# Patient Record
Sex: MALE | Race: WHITE | NOT HISPANIC OR LATINO | Employment: OTHER | URBAN - METROPOLITAN AREA
[De-identification: names, ages, dates, MRNs, and addresses within clinical notes are randomized per-mention and may not be internally consistent; named-entity substitution may affect disease eponyms.]

---

## 2017-05-30 ENCOUNTER — APPOINTMENT (OUTPATIENT)
Dept: RADIOLOGY | Facility: MEDICAL CENTER | Age: 77
End: 2017-05-30
Attending: NEUROLOGICAL SURGERY
Payer: COMMERCIAL

## 2017-05-30 ENCOUNTER — HOSPITAL ENCOUNTER (OUTPATIENT)
Facility: MEDICAL CENTER | Age: 77
End: 2017-06-01
Attending: NEUROLOGICAL SURGERY | Admitting: NEUROLOGICAL SURGERY
Payer: COMMERCIAL

## 2017-05-30 PROBLEM — M51.36 DDD (DEGENERATIVE DISC DISEASE), LUMBAR: Status: ACTIVE | Noted: 2017-05-30

## 2017-05-30 LAB
ANION GAP SERPL CALC-SCNC: 8 MMOL/L (ref 0–11.9)
APPEARANCE UR: CLEAR
APTT PPP: 29 SEC (ref 24.7–36)
BILIRUB UR QL STRIP.AUTO: NEGATIVE
BUN SERPL-MCNC: 14 MG/DL (ref 8–22)
CALCIUM SERPL-MCNC: 9.5 MG/DL (ref 8.4–10.2)
CHLORIDE SERPL-SCNC: 105 MMOL/L (ref 96–112)
CO2 SERPL-SCNC: 27 MMOL/L (ref 20–33)
COLOR UR: YELLOW
CREAT SERPL-MCNC: 0.87 MG/DL (ref 0.5–1.4)
GFR SERPL CREATININE-BSD FRML MDRD: >60 ML/MIN/1.73 M 2
GLUCOSE SERPL-MCNC: 91 MG/DL (ref 65–99)
GLUCOSE UR STRIP.AUTO-MCNC: NEGATIVE MG/DL
INR PPP: 0.97 (ref 0.87–1.13)
KETONES UR STRIP.AUTO-MCNC: ABNORMAL MG/DL
LEUKOCYTE ESTERASE UR QL STRIP.AUTO: NEGATIVE
MICRO URNS: ABNORMAL
NITRITE UR QL STRIP.AUTO: NEGATIVE
PH UR STRIP.AUTO: 6.5 [PH]
POTASSIUM SERPL-SCNC: 4.2 MMOL/L (ref 3.6–5.5)
PROT UR QL STRIP: NEGATIVE MG/DL
PROTHROMBIN TIME: 13.2 SEC (ref 12–14.6)
RBC UR QL AUTO: NEGATIVE
SODIUM SERPL-SCNC: 140 MMOL/L (ref 135–145)
SP GR UR STRIP.AUTO: 1.01

## 2017-05-30 PROCEDURE — 160035 HCHG PACU - 1ST 60 MINS PHASE I: Performed by: NEUROLOGICAL SURGERY

## 2017-05-30 PROCEDURE — 502240 HCHG MISC OR SUPPLY RC 0272: Performed by: NEUROLOGICAL SURGERY

## 2017-05-30 PROCEDURE — 85730 THROMBOPLASTIN TIME PARTIAL: CPT

## 2017-05-30 PROCEDURE — 81003 URINALYSIS AUTO W/O SCOPE: CPT

## 2017-05-30 PROCEDURE — 80048 BASIC METABOLIC PNL TOTAL CA: CPT

## 2017-05-30 PROCEDURE — 501838 HCHG SUTURE GENERAL: Performed by: NEUROLOGICAL SURGERY

## 2017-05-30 PROCEDURE — 500885 HCHG PACK, JACKSON TABLE: Performed by: NEUROLOGICAL SURGERY

## 2017-05-30 PROCEDURE — 700101 HCHG RX REV CODE 250

## 2017-05-30 PROCEDURE — 502626 HCHG SURGIFLO HEMOSTATIC MATRIX 6ML: Performed by: NEUROLOGICAL SURGERY

## 2017-05-30 PROCEDURE — 96374 THER/PROPH/DIAG INJ IV PUSH: CPT

## 2017-05-30 PROCEDURE — 160048 HCHG OR STATISTICAL LEVEL 1-5: Performed by: NEUROLOGICAL SURGERY

## 2017-05-30 PROCEDURE — 160002 HCHG RECOVERY MINUTES (STAT): Performed by: NEUROLOGICAL SURGERY

## 2017-05-30 PROCEDURE — 72020 X-RAY EXAM OF SPINE 1 VIEW: CPT

## 2017-05-30 PROCEDURE — G0378 HOSPITAL OBSERVATION PER HR: HCPCS

## 2017-05-30 PROCEDURE — 85610 PROTHROMBIN TIME: CPT

## 2017-05-30 PROCEDURE — 160009 HCHG ANES TIME/MIN: Performed by: NEUROLOGICAL SURGERY

## 2017-05-30 PROCEDURE — A9270 NON-COVERED ITEM OR SERVICE: HCPCS | Performed by: PHYSICIAN ASSISTANT

## 2017-05-30 PROCEDURE — 700102 HCHG RX REV CODE 250 W/ 637 OVERRIDE(OP): Performed by: PHYSICIAN ASSISTANT

## 2017-05-30 PROCEDURE — 160036 HCHG PACU - EA ADDL 30 MINS PHASE I: Performed by: NEUROLOGICAL SURGERY

## 2017-05-30 PROCEDURE — 160041 HCHG SURGERY MINUTES - EA ADDL 1 MIN LEVEL 4: Performed by: NEUROLOGICAL SURGERY

## 2017-05-30 PROCEDURE — 502627 HCHG HEMOSTAT, SURGICEL 4X4: Performed by: NEUROLOGICAL SURGERY

## 2017-05-30 PROCEDURE — 700111 HCHG RX REV CODE 636 W/ 250 OVERRIDE (IP): Performed by: PHYSICIAN ASSISTANT

## 2017-05-30 PROCEDURE — 160029 HCHG SURGERY MINUTES - 1ST 30 MINS LEVEL 4: Performed by: NEUROLOGICAL SURGERY

## 2017-05-30 PROCEDURE — 700111 HCHG RX REV CODE 636 W/ 250 OVERRIDE (IP)

## 2017-05-30 PROCEDURE — 700112 HCHG RX REV CODE 229: Performed by: PHYSICIAN ASSISTANT

## 2017-05-30 RX ORDER — RISPERIDONE 0.5 MG/1
0.5 TABLET ORAL
Status: DISCONTINUED | OUTPATIENT
Start: 2017-05-30 | End: 2017-06-01 | Stop reason: HOSPADM

## 2017-05-30 RX ORDER — HYDROCODONE BITARTRATE AND ACETAMINOPHEN 10; 325 MG/1; MG/1
1-2 TABLET ORAL EVERY 4 HOURS PRN
Status: DISCONTINUED | OUTPATIENT
Start: 2017-05-30 | End: 2017-06-01 | Stop reason: HOSPADM

## 2017-05-30 RX ORDER — DEXTROSE MONOHYDRATE, SODIUM CHLORIDE, AND POTASSIUM CHLORIDE 50; 1.49; 4.5 G/1000ML; G/1000ML; G/1000ML
INJECTION, SOLUTION INTRAVENOUS CONTINUOUS
Status: DISCONTINUED | OUTPATIENT
Start: 2017-05-30 | End: 2017-06-01 | Stop reason: HOSPADM

## 2017-05-30 RX ORDER — LOSARTAN POTASSIUM 25 MG/1
25 TABLET ORAL EVERY EVENING
Status: DISCONTINUED | OUTPATIENT
Start: 2017-05-30 | End: 2017-06-01 | Stop reason: HOSPADM

## 2017-05-30 RX ORDER — RISPERIDONE 0.5 MG/1
0.5 TABLET ORAL
COMMUNITY

## 2017-05-30 RX ORDER — LIDOCAINE AND PRILOCAINE 25; 25 MG/G; MG/G
1 CREAM TOPICAL
Status: DISCONTINUED | OUTPATIENT
Start: 2017-05-30 | End: 2017-06-01 | Stop reason: HOSPADM

## 2017-05-30 RX ORDER — PRAVASTATIN SODIUM 40 MG
20 TABLET ORAL NIGHTLY
COMMUNITY

## 2017-05-30 RX ORDER — ONDANSETRON 4 MG/1
4 TABLET, ORALLY DISINTEGRATING ORAL EVERY 4 HOURS PRN
Status: DISCONTINUED | OUTPATIENT
Start: 2017-05-30 | End: 2017-06-01 | Stop reason: HOSPADM

## 2017-05-30 RX ORDER — BUSPIRONE HYDROCHLORIDE 10 MG/1
10 TABLET ORAL 2 TIMES DAILY
Status: DISCONTINUED | OUTPATIENT
Start: 2017-05-30 | End: 2017-06-01 | Stop reason: HOSPADM

## 2017-05-30 RX ORDER — DEXTROSE MONOHYDRATE, SODIUM CHLORIDE, AND POTASSIUM CHLORIDE 50; 1.49; 4.5 G/1000ML; G/1000ML; G/1000ML
INJECTION, SOLUTION INTRAVENOUS
Status: COMPLETED
Start: 2017-05-30 | End: 2017-05-30

## 2017-05-30 RX ORDER — CLONAZEPAM 0.5 MG/1
0.5 TABLET ORAL DAILY
COMMUNITY

## 2017-05-30 RX ORDER — OXYCODONE HYDROCHLORIDE AND ACETAMINOPHEN 5; 325 MG/1; MG/1
1-2 TABLET ORAL EVERY 4 HOURS PRN
Status: DISCONTINUED | OUTPATIENT
Start: 2017-05-30 | End: 2017-06-01 | Stop reason: HOSPADM

## 2017-05-30 RX ORDER — CARBIDOPA AND LEVODOPA 50; 200 MG/1; MG/1
1 TABLET, EXTENDED RELEASE ORAL
Status: DISCONTINUED | OUTPATIENT
Start: 2017-05-30 | End: 2017-06-01 | Stop reason: HOSPADM

## 2017-05-30 RX ORDER — SODIUM CHLORIDE, SODIUM LACTATE, POTASSIUM CHLORIDE, CALCIUM CHLORIDE 600; 310; 30; 20 MG/100ML; MG/100ML; MG/100ML; MG/100ML
INJECTION, SOLUTION INTRAVENOUS CONTINUOUS
Status: DISCONTINUED | OUTPATIENT
Start: 2017-05-30 | End: 2017-06-01 | Stop reason: HOSPADM

## 2017-05-30 RX ORDER — DOCUSATE SODIUM 100 MG/1
100 CAPSULE, LIQUID FILLED ORAL 2 TIMES DAILY
Status: DISCONTINUED | OUTPATIENT
Start: 2017-05-30 | End: 2017-06-01 | Stop reason: HOSPADM

## 2017-05-30 RX ORDER — AMITRIPTYLINE HYDROCHLORIDE 25 MG/1
25 TABLET, FILM COATED ORAL
Status: DISCONTINUED | OUTPATIENT
Start: 2017-05-30 | End: 2017-06-01 | Stop reason: HOSPADM

## 2017-05-30 RX ORDER — BUSPIRONE HYDROCHLORIDE 10 MG/1
10 TABLET ORAL 2 TIMES DAILY
COMMUNITY

## 2017-05-30 RX ORDER — CEPHALEXIN 500 MG/1
500 CAPSULE ORAL EVERY 6 HOURS
Status: DISCONTINUED | OUTPATIENT
Start: 2017-05-31 | End: 2017-06-01 | Stop reason: HOSPADM

## 2017-05-30 RX ORDER — ENEMA 19; 7 G/133ML; G/133ML
1 ENEMA RECTAL
Status: DISCONTINUED | OUTPATIENT
Start: 2017-05-30 | End: 2017-06-01 | Stop reason: HOSPADM

## 2017-05-30 RX ORDER — CALCIUM CARBONATE 500 MG/1
500 TABLET, CHEWABLE ORAL 2 TIMES DAILY
Status: DISCONTINUED | OUTPATIENT
Start: 2017-05-30 | End: 2017-06-01 | Stop reason: HOSPADM

## 2017-05-30 RX ORDER — LITHIUM CARBONATE 300 MG/1
300 TABLET, FILM COATED, EXTENDED RELEASE ORAL 2 TIMES DAILY
Status: DISCONTINUED | OUTPATIENT
Start: 2017-05-30 | End: 2017-06-01 | Stop reason: HOSPADM

## 2017-05-30 RX ORDER — ONDANSETRON 2 MG/ML
4 INJECTION INTRAMUSCULAR; INTRAVENOUS EVERY 4 HOURS PRN
Status: DISCONTINUED | OUTPATIENT
Start: 2017-05-30 | End: 2017-06-01 | Stop reason: HOSPADM

## 2017-05-30 RX ORDER — BUPIVACAINE HYDROCHLORIDE AND EPINEPHRINE 5; 5 MG/ML; UG/ML
INJECTION, SOLUTION EPIDURAL; INTRACAUDAL; PERINEURAL
Status: DISCONTINUED | OUTPATIENT
Start: 2017-05-30 | End: 2017-05-30 | Stop reason: HOSPADM

## 2017-05-30 RX ORDER — PRAZOSIN HYDROCHLORIDE 2 MG/1
4 CAPSULE ORAL
Status: DISCONTINUED | OUTPATIENT
Start: 2017-05-30 | End: 2017-06-01 | Stop reason: HOSPADM

## 2017-05-30 RX ORDER — CARBIDOPA AND LEVODOPA 50; 200 MG/1; MG/1
1 TABLET, EXTENDED RELEASE ORAL
COMMUNITY

## 2017-05-30 RX ORDER — UREA 1 ML/10ML
1 LOTION TOPICAL 2 TIMES DAILY
Status: DISCONTINUED | OUTPATIENT
Start: 2017-05-30 | End: 2017-06-01 | Stop reason: HOSPADM

## 2017-05-30 RX ORDER — SODIUM CHLORIDE, SODIUM LACTATE, POTASSIUM CHLORIDE, AND CALCIUM CHLORIDE .6; .31; .03; .02 G/100ML; G/100ML; G/100ML; G/100ML
IRRIGANT IRRIGATION
Status: DISCONTINUED | OUTPATIENT
Start: 2017-05-30 | End: 2017-05-30 | Stop reason: HOSPADM

## 2017-05-30 RX ORDER — CEFAZOLIN SODIUM 2 G/100ML
2 INJECTION, SOLUTION INTRAVENOUS EVERY 8 HOURS
Status: COMPLETED | OUTPATIENT
Start: 2017-05-30 | End: 2017-05-31

## 2017-05-30 RX ORDER — PRAZOSIN HYDROCHLORIDE 2 MG/1
4 CAPSULE ORAL
COMMUNITY

## 2017-05-30 RX ORDER — DIPHENHYDRAMINE HYDROCHLORIDE 50 MG/ML
25 INJECTION INTRAMUSCULAR; INTRAVENOUS EVERY 6 HOURS PRN
Status: DISCONTINUED | OUTPATIENT
Start: 2017-05-30 | End: 2017-06-01 | Stop reason: HOSPADM

## 2017-05-30 RX ORDER — LIDOCAINE HYDROCHLORIDE 10 MG/ML
0.5 INJECTION, SOLUTION INFILTRATION; PERINEURAL
Status: DISCONTINUED | OUTPATIENT
Start: 2017-05-30 | End: 2017-06-01 | Stop reason: HOSPADM

## 2017-05-30 RX ORDER — POLYETHYLENE GLYCOL 3350 17 G/17G
1 POWDER, FOR SOLUTION ORAL 2 TIMES DAILY PRN
Status: DISCONTINUED | OUTPATIENT
Start: 2017-05-30 | End: 2017-06-01 | Stop reason: HOSPADM

## 2017-05-30 RX ORDER — UREA 1 ML/10ML
1 LOTION TOPICAL 2 TIMES DAILY
COMMUNITY

## 2017-05-30 RX ORDER — AMOXICILLIN 250 MG
1 CAPSULE ORAL
Status: DISCONTINUED | OUTPATIENT
Start: 2017-05-30 | End: 2017-06-01 | Stop reason: HOSPADM

## 2017-05-30 RX ORDER — LITHIUM CARBONATE 300 MG/1
300 TABLET, FILM COATED, EXTENDED RELEASE ORAL 2 TIMES DAILY
COMMUNITY

## 2017-05-30 RX ORDER — AMOXICILLIN 250 MG
1 CAPSULE ORAL NIGHTLY
Status: DISCONTINUED | OUTPATIENT
Start: 2017-05-30 | End: 2017-06-01 | Stop reason: HOSPADM

## 2017-05-30 RX ORDER — LOSARTAN POTASSIUM 25 MG/1
25 TABLET ORAL EVERY EVENING
COMMUNITY

## 2017-05-30 RX ORDER — OMEPRAZOLE 20 MG/1
20 CAPSULE, DELAYED RELEASE ORAL DAILY
Status: DISCONTINUED | OUTPATIENT
Start: 2017-05-31 | End: 2017-06-01 | Stop reason: HOSPADM

## 2017-05-30 RX ORDER — TIZANIDINE 4 MG/1
2 TABLET ORAL 3 TIMES DAILY PRN
Status: DISCONTINUED | OUTPATIENT
Start: 2017-05-30 | End: 2017-06-01 | Stop reason: HOSPADM

## 2017-05-30 RX ORDER — PRAVASTATIN SODIUM 20 MG
20 TABLET ORAL NIGHTLY
Status: DISCONTINUED | OUTPATIENT
Start: 2017-05-30 | End: 2017-06-01 | Stop reason: HOSPADM

## 2017-05-30 RX ORDER — DIPHENHYDRAMINE HCL 25 MG
25 TABLET ORAL EVERY 6 HOURS PRN
Status: DISCONTINUED | OUTPATIENT
Start: 2017-05-30 | End: 2017-06-01 | Stop reason: HOSPADM

## 2017-05-30 RX ORDER — CLONAZEPAM 0.5 MG/1
0.5 TABLET ORAL DAILY
Status: DISCONTINUED | OUTPATIENT
Start: 2017-05-31 | End: 2017-06-01 | Stop reason: HOSPADM

## 2017-05-30 RX ORDER — BISACODYL 10 MG
10 SUPPOSITORY, RECTAL RECTAL
Status: DISCONTINUED | OUTPATIENT
Start: 2017-05-30 | End: 2017-06-01 | Stop reason: HOSPADM

## 2017-05-30 RX ORDER — ASPIRIN 81 MG/1
81 TABLET ORAL DAILY
Status: ON HOLD | COMMUNITY
End: 2017-05-31

## 2017-05-30 RX ORDER — AMITRIPTYLINE HYDROCHLORIDE 25 MG/1
25 TABLET, FILM COATED ORAL
COMMUNITY

## 2017-05-30 RX ORDER — NICOTINE POLACRILEX 4 MG/1
1 GUM, CHEWING ORAL DAILY
COMMUNITY

## 2017-05-30 RX ORDER — LABETALOL HYDROCHLORIDE 5 MG/ML
10 INJECTION, SOLUTION INTRAVENOUS
Status: DISCONTINUED | OUTPATIENT
Start: 2017-05-30 | End: 2017-06-01 | Stop reason: HOSPADM

## 2017-05-30 RX ADMIN — CARBIDOPA AND LEVODOPA 1 TABLET: 50; 200 TABLET, EXTENDED RELEASE ORAL at 20:45

## 2017-05-30 RX ADMIN — LOSARTAN POTASSIUM 25 MG: 25 TABLET, FILM COATED ORAL at 20:44

## 2017-05-30 RX ADMIN — CEFAZOLIN SODIUM 2 G: 2 INJECTION, SOLUTION INTRAVENOUS at 20:43

## 2017-05-30 RX ADMIN — STANDARDIZED SENNA CONCENTRATE AND DOCUSATE SODIUM 1 TABLET: 8.6; 5 TABLET, FILM COATED ORAL at 20:44

## 2017-05-30 RX ADMIN — POTASSIUM CHLORIDE, DEXTROSE MONOHYDRATE AND SODIUM CHLORIDE 1000 ML: 150; 5; 450 INJECTION, SOLUTION INTRAVENOUS at 14:30

## 2017-05-30 RX ADMIN — PRAVASTATIN SODIUM 20 MG: 20 TABLET ORAL at 20:44

## 2017-05-30 RX ADMIN — DOCUSATE SODIUM 100 MG: 100 CAPSULE ORAL at 20:44

## 2017-05-30 RX ADMIN — BUSPIRONE HYDROCHLORIDE 10 MG: 10 TABLET ORAL at 20:44

## 2017-05-30 RX ADMIN — SODIUM CHLORIDE, SODIUM LACTATE, POTASSIUM CHLORIDE, CALCIUM CHLORIDE: 600; 310; 30; 20 INJECTION, SOLUTION INTRAVENOUS at 08:45

## 2017-05-30 RX ADMIN — LITHIUM CARBONATE 300 MG: 300 TABLET, EXTENDED RELEASE ORAL at 20:44

## 2017-05-30 RX ADMIN — RISPERIDONE 0.5 MG: 0.5 TABLET, FILM COATED ORAL at 20:44

## 2017-05-30 RX ADMIN — CARBIDOPA AND LEVODOPA 2 TABLET: 25; 100 TABLET ORAL at 17:55

## 2017-05-30 RX ADMIN — BETAMETHASONE VALERATE 1 APPLICATION: 1 CREAM TOPICAL at 20:45

## 2017-05-30 RX ADMIN — AMITRIPTYLINE HYDROCHLORIDE 25 MG: 25 TABLET, FILM COATED ORAL at 20:46

## 2017-05-30 RX ADMIN — PRAZOSIN HYDROCHLORIDE 4 MG: 2 CAPSULE ORAL at 20:43

## 2017-05-30 ASSESSMENT — PAIN SCALES - GENERAL
PAINLEVEL_OUTOF10: 0
PAINLEVEL_OUTOF10: 4
PAINLEVEL_OUTOF10: 0

## 2017-05-30 NOTE — IP AVS SNAPSHOT
" <p align=\"LEFT\"><IMG SRC=\"//[email or web address]\" alt=\"Image\" WIDTH=\"50%\" HEIGHT=\"200\" BORDER=\"\"></p>                   Name:Antoni Shirley  Medical Record Number:[id number]  CSN: [id number]    YOB: 1940   Age: 76 y.o.  Sex: male  HT:1.702 m (5' 7\") WT: 70.5 kg (155 lb 6.8 oz)          Admit Date: 5/30/2017     Discharge Date:   Today's Date: 6/1/2017  Attending Doctor:  Ant Ovalles M.D.                  Allergies:  Review of patient's allergies indicates no known allergies.          Follow-up Information     1. Follow up with [facility]. Go on 7/11/2017.    Contact information    [address] [facility].  [place] [address]  [place] [address]  [phone]        2. Follow up with Primary Care Doctor. Schedule an appointment as soon as possible for a visit in 2 weeks.    Why:  For wound re-check         Medication List      Take these Medications        Instructions    amitriptyline 25 MG Tabs   Commonly known as:  ELAVIL    Take 25 mg by mouth every bedtime.   Dose:  25 mg       betamethasone valerate 0.1 % Crea   Commonly known as:  VALISONE    Apply 1 Application to affected area(s) 2 times a day. Left foot   Dose:  1 Application       busPIRone 10 MG Tabs   Commonly known as:  BUSPAR    Take 10 mg by mouth 2 times a day. Indications: Symptoms of Feeling Anxious   Dose:  10 mg       * carbidopa-levodopa SR  MG per tablet   Commonly known as:  SINEMET CR    Take 1 Tab by mouth every bedtime. Indications: Parkinson's Disease   Dose:  1 Tab       * carbidopa-levodopa  MG Tabs   Commonly known as:  SINEMET    Take 2 Tabs by mouth 3 times a day, with meals.   Dose:  2 Tab       cephALEXin 500 MG Caps   Commonly known as:  KEFLEX    Take 1 Cap by mouth every 6 hours for 5 days.   Dose:  500 mg       clonazepam 0.5 MG Tabs   Commonly known as:  KLONOPIN    Take 0.5 mg by mouth every day. Indications: Panic Disorder   Dose:  0.5 mg       hydrocodone-acetaminophen 5-325 MG Tabs per tablet   Commonly " known as:  NORCO    Take 1-2 Tabs by mouth every four hours as needed.   Dose:  1-2 Tab       lithium  MG Tbcr   Commonly known as:  LITHOBID    Take 300 mg by mouth 2 times a day. Indications: mood stabilizer   Dose:  300 mg       losartan 25 MG Tabs   Commonly known as:  COZAAR    Take 25 mg by mouth every evening. Indications: blood pressure   Dose:  25 mg       omeprazole 20 MG Tbec delayed-release tablet   Commonly known as:  PRILOSEC    Take 1 Tab by mouth every day.   Dose:  1 Tab       PRAVACHOL 40 MG tablet   Generic drug:  pravastatin    Take 20 mg by mouth every evening. Indications: high chloesterol   Dose:  20 mg       prazosin 2 MG Caps   Commonly known as:  MINIPRESS    Take 4 mg by mouth every bedtime.   Dose:  4 mg       risperidone 0.5 MG Tabs   Commonly known as:  RISPERDAL    Take 0.5 mg by mouth every bedtime. Indications: Schizophrenia   Dose:  0.5 mg       tizanidine 2 MG tablet   Commonly known as:  ZANAFLEX    Take 1 Tab by mouth 3 times a day as needed (As needed for muscle spasm).   Dose:  2 mg       urea 10 % lotion    Apply 1 Application to affected area(s) 2 Times a Day.   Dose:  1 Application       vitamin D 1000 UNIT Tabs   Commonly known as:  cholecalciferol    Take 1,000 Units by mouth every day.   Dose:  1000 Units       * Notice:  This list has 2 medication(s) that are the same as other medications prescribed for you. Read the directions carefully, and ask your doctor or other care provider to review them with you.

## 2017-05-30 NOTE — PROGRESS NOTES
"Pt was A&Ox4, pt denied numbness, stated he has tingling in all of his L toes (except for the big toe), pt stated that pain is 0/10.    Pt's heart rate is <50, but per PACU nurse \"it's perfect according to Dr. Jesus\"    Bed alarm on, call light within reach, pt educated on importance of using call light when he needs assistance, pt verbalized undestanding.  "

## 2017-05-30 NOTE — IP AVS SNAPSHOT
" Home Care Instructions                                                                                                                  Name:Antoni Shirley  Medical Record Number:[id number]  CSN: [id number]    YOB: 1940   Age: 76 y.o.  Sex: male  HT:1.702 m (5' 7\") WT: 70.5 kg (155 lb 6.8 oz)          Admit Date: 5/30/2017     Discharge Date:   Today's Date: 6/1/2017  Attending Doctor:  Ant Ovalles M.D.                  Allergies:  Review of patient's allergies indicates no known allergies.            Discharge Instructions       Discharge Instructions    Discharged to home by car with relative. Discharged via wheelchair, hospital escort: Yes.  Special equipment needed: Wheelchair    Be sure to schedule a follow-up appointment with your primary care doctor or any specialists as instructed.     Discharge Plan:   Ambulate as tolerated.    Avoid pushing, pulling or lifting greater than 15 pounds.    Okay to shower, but do not submerge the wound.   Driving permitted 2 weeks after surgery.    Follow-up appointment with [facility] on July 11, 2017 at 12:30.  Follow-up with primary care physician in 2 weeks for incision check.  Contact our office at any point should you have any questions or concerns.    Incision Care  An incision is when a surgeon cuts into your body. After surgery, the incision needs to be cared for properly to prevent infection.   HOW TO CARE FOR YOUR INCISION  · Take medicines only as directed by your health care provider.  · There are many different ways to close and cover an incision, including stitches, skin glue, and adhesive strips. Follow your health care provider's instructions on:  ¨ Incision care.  ¨ Bandage (dressing) changes and removal.  ¨ Incision closure removal.  · Do not take baths, swim, or use a hot tub until your health care provider approves. You may shower as directed by your health care provider.  · Resume your normal diet and activities as directed.  · Use anti-itch " medicine (such as an antihistamine) as directed by your health care provider. The incision may itch while it is healing. Do not pick or scratch at the incision.  · Drink enough fluid to keep your urine clear or pale yellow.  SEEK MEDICAL CARE IF:   · You have drainage, redness, swelling, or pain at your incision site.  · You have muscle aches, chills, or a general ill feeling.  · You notice a bad smell coming from the incision or dressing.  · Your incision edges separate after the sutures, staples, or skin adhesive strips have been removed.  · You have persistent nausea or vomiting.  · You have a fever.  · You are dizzy.  SEEK IMMEDIATE MEDICAL CARE IF:   · You have a rash.  · You faint.  · You have difficulty breathing.  MAKE SURE YOU:   · Understand these instructions.  · Will watch your condition.  · Will get help right away if you are not doing well or get worse.     This information is not intended to replace advice given to you by your health care provider. Make sure you discuss any questions you have with your health care provider.     Document Released: 07/07/2006 Document Revised: 01/08/2016 Document Reviewed: 02/11/2015  GoAlbert Interactive Patient Education ©2016 [employer].         I understand that a diet low in cholesterol, fat, and sodium is recommended for good health. Unless I have been given specific instructions below for another diet, I accept this instruction as my diet prescription.       Special Instructions: None    · Is patient discharged on Warfarin / Coumadin?   No     · Is patient Post Blood Transfusion?  No    Depression / Suicide Risk    As you are discharged from this [facility] facility, it is important to learn how to keep safe from harming yourself.    Recognize the warning signs:  · Abrupt changes in personality, positive or negative- including increase in energy   · Giving away possessions  · Change in eating patterns- significant weight changes-  positive or  negative  · Change in sleeping patterns- unable to sleep or sleeping all the time   · Unwillingness or inability to communicate  · Depression  · Unusual sadness, discouragement and loneliness  · Talk of wanting to die  · Neglect of personal appearance   · Rebelliousness- reckless behavior  · Withdrawal from people/activities they love  · Confusion- inability to concentrate     If you or a loved one observes any of these behaviors or has concerns about self-harm, here's what you can do:  · Talk about it- your feelings and reasons for harming yourself  · Remove any means that you might use to hurt yourself (examples: pills, rope, extension cords, firearm)  · Get professional help from the community (Mental Health, Substance Abuse, psychological counseling)  · Do not be alone:Call your Safe Contact- someone whom you trust who will be there for you.  · Call your local CRISIS HOTLINE 964-5385 or 280-910-1938  · Call your local Children's Mobile Crisis Response Team [place] (723) 640-7868 or www.Pipelinefx  · Call the toll free National Suicide Prevention Hotlines   · National Suicide Prevention Lifeline 585-337-HDHR (6840)  · National Hope Line Network 800-SUICIDE (831-3967)        Follow-up Information     1. Follow up with [facility]. Go on 7/11/2017.    Contact information    [address]  [facility] [address]  [place] [address]  [phone]        2. Follow up with Primary Care Doctor. Schedule an appointment as soon as possible for a visit in 2 weeks.    Why:  For wound re-check         Discharge Medication Instructions:    Below are the medications your physician expects you to take upon discharge:    Review all your home medications and newly ordered medications with your doctor and/or pharmacist. Follow medication instructions as directed by your doctor and/or pharmacist.    Please keep your medication list with you and share with your physician.               Medication List      START taking these  medications        Instructions    Morning Afternoon Evening Bedtime    cephALEXin 500 MG Caps   Last time this was given:  500 mg on 6/1/2017  5:40 AM   Commonly known as:  KEFLEX        Take 1 Cap by mouth every 6 hours for 5 days.   Dose:  500 mg                        hydrocodone-acetaminophen 5-325 MG Tabs per tablet   Last time this was given:  1 Tab on 5/31/2017  4:41 PM   Commonly known as:  NORCO        Take 1-2 Tabs by mouth every four hours as needed.   Dose:  1-2 Tab                        tizanidine 2 MG tablet   Last time this was given:  2 mg on 5/31/2017  8:53 AM   Commonly known as:  ZANAFLEX        Take 1 Tab by mouth 3 times a day as needed (As needed for muscle spasm).   Dose:  2 mg                          CONTINUE taking these medications        Instructions    Morning Afternoon Evening Bedtime    amitriptyline 25 MG Tabs   Last time this was given:  25 mg on 5/31/2017  8:03 PM   Commonly known as:  ELAVIL        Take 25 mg by mouth every bedtime.   Dose:  25 mg                        betamethasone valerate 0.1 % Crea   Last time this was given:  1 Application on 5/31/2017  8:04 PM   Commonly known as:  VALISONE        Apply 1 Application to affected area(s) 2 times a day. Left foot   Dose:  1 Application                        busPIRone 10 MG Tabs   Last time this was given:  10 mg on 5/31/2017  8:05 PM   Commonly known as:  BUSPAR        Take 10 mg by mouth 2 times a day. Indications: Symptoms of Feeling Anxious   Dose:  10 mg                        * carbidopa-levodopa SR  MG per tablet   Last time this was given:  1 Tab on 5/31/2017  8:03 PM   Commonly known as:  SINEMET CR        Take 1 Tab by mouth every bedtime. Indications: Parkinson's Disease   Dose:  1 Tab                        * carbidopa-levodopa  MG Tabs   Last time this was given:  2 Tabs on 6/1/2017  8:26 AM   Commonly known as:  SINEMET        Take 2 Tabs by mouth 3 times a day, with meals.   Dose:  2 Tab                         clonazepam 0.5 MG Tabs   Last time this was given:  0.5 mg on 5/31/2017  8:52 AM   Commonly known as:  KLONOPIN        Take 0.5 mg by mouth every day. Indications: Panic Disorder   Dose:  0.5 mg                        lithium  MG Tbcr   Last time this was given:  300 mg on 5/31/2017  8:03 PM   Commonly known as:  LITHOBID        Take 300 mg by mouth 2 times a day. Indications: mood stabilizer   Dose:  300 mg                        losartan 25 MG Tabs   Last time this was given:  25 mg on 5/31/2017  8:03 PM   Commonly known as:  COZAAR        Take 25 mg by mouth every evening. Indications: blood pressure   Dose:  25 mg                        omeprazole 20 MG Tbec delayed-release tablet   Commonly known as:  PRILOSEC        Take 1 Tab by mouth every day.   Dose:  1 Tab                        PRAVACHOL 40 MG tablet   Last time this was given:  20 mg on 5/31/2017  8:05 PM   Generic drug:  pravastatin        Take 20 mg by mouth every evening. Indications: high chloesterol   Dose:  20 mg                        prazosin 2 MG Caps   Last time this was given:  4 mg on 5/31/2017  8:03 PM   Commonly known as:  MINIPRESS        Take 4 mg by mouth every bedtime.   Dose:  4 mg                        risperidone 0.5 MG Tabs   Last time this was given:  0.5 mg on 5/31/2017  8:04 PM   Commonly known as:  RISPERDAL        Take 0.5 mg by mouth every bedtime. Indications: Schizophrenia   Dose:  0.5 mg                        urea 10 % lotion        Apply 1 Application to affected area(s) 2 Times a Day.   Dose:  1 Application                        vitamin D 1000 UNIT Tabs   Commonly known as:  cholecalciferol        Take 1,000 Units by mouth every day.   Dose:  1000 Units                        * Notice:  This list has 2 medication(s) that are the same as other medications prescribed for you. Read the directions carefully, and ask your doctor or other care provider to review them with you.      STOP taking these  medications     aspirin 81 MG EC tablet                    Where to Get Your Medications      Information about where to get these medications is not yet available     ! Ask your nurse or doctor about these medications    - cephALEXin 500 MG Caps  - hydrocodone-acetaminophen 5-325 MG Tabs per tablet  - tizanidine 2 MG tablet            Instructions           Diet / Nutrition:    Follow any diet instructions given to you by your doctor or the dietician, including how much salt (sodium) you are allowed each day.    If you are overweight, talk to your doctor about a weight reduction plan.    Activity:    Remain physically active following your doctor's instructions about exercise and activity.    Rest often.     Any time you become even a little tired or short of breath, SIT DOWN and rest.    Worsening Symptoms:    Report any of the following signs and symptoms to the doctor's office immediately:    *Pain of jaw, arm, or neck  *Chest pain not relieved by medication                               *Dizziness or loss of consciousness  *Difficulty breathing even when at rest   *More tired than usual                                       *Bleeding drainage or swelling of surgical site  *Swelling of feet, ankles, legs or stomach                 *Fever (>100ºF)  *Pink or blood tinged sputum  *Weight gain (3lbs/day or 5lbs /week)           *Shock from internal defibrillator (if applicable)  *Palpitations or irregular heartbeats                *Cool and/or numb extremities    Stroke Awareness    Common Risk Factors for Stroke include:    Age  Atrial Fibrillation  Carotid Artery Stenosis  Diabetes Mellitus  Excessive alcohol consumption  High blood pressure  Overweight   Physical inactivity  Smoking    Warning signs and symptoms of a stroke include:    *Sudden numbness or weakness of the face, arm or leg (especially on one side of the body).  *Sudden confusion, trouble speaking or understanding.  *Sudden trouble seeing in one or  both eyes.  *Sudden trouble walking, dizziness, loss of balance or coordination.Sudden severe headache with no known cause.    It is very important to get treatment quickly when a stroke occurs. If you experience any of the above warning signs, call 911 immediately.                   Disclaimer         Quit Smoking / Tobacco Use:    I understand the use of any tobacco products increases my chance of suffering from future heart disease or stroke and could cause other illnesses which may shorten my life. Quitting the use of tobacco products is the single most important thing I can do to improve my health. For further information on smoking / tobacco cessation call a Toll Free Quit Line at 1-675.376.5059 (*National Cancer [place]) or 1-510.526.5877 (American Lung Association) or you can access the web based program at www.lungDrywave.org.    [place] Tobacco Users Help Line:  (588) 891-9486       Toll Free: 1-280.442.2968  Quit Tobacco Program [facility] Management Services (935)932-3352    Crisis Hotline:    [place] Crisis Hotline:  1-454-MDYETVI or 1-320.128.3419    [place] Crisis Hotline:    1-738.420.5604 or 726-155-7425    Discharge Survey:   Thank you for choosing [facility]. We hope we did everything we could to make your hospital stay a pleasant one. You may be receiving a phone survey and we would appreciate your time and participation in answering the questions. Your input is very valuable to us in our efforts to improve our service to our patients and their families.        My signature on this form indicates that:    1. I have reviewed and understand the above information.  2. My questions regarding this information have been answered to my satisfaction.  3. I have formulated a plan with my discharge nurse to obtain my prescribed medications for home.                  Disclaimer         __________________________________                     __________       ________                       Patient Signature                                                  Date                    Time

## 2017-05-30 NOTE — OR SURGEON
Immediate Post-Operative Note      PreOp Diagnosis: L3-5 DDD, radiculopathy.     PostOp Diagnosis: Same.     Procedure(s):  LUMBAR LAMINECTOMY DISKECTOMY FOR MINIMALLY INVASIVE CORRINE L3-5 LAMINOTOMIES VIA LT SIDED UNI-PORTAL APPROACH - Wound Class: Clean with Drain    Surgeon(s):  Ant Ovalles M.D.    Anesthesiologist/Type of Anesthesia:  Anesthesiologist: Tulio Jesus M.D./General    Surgical Staff:  Assistant: Bryan Costa PA-C  Circulator: Bessy Da Silva R.N.  Relief Circulator: Kaila Wild R.N.  Scrub Person: Aubrie Cronin  Radiology Technologist: Liliana Devries    Specimen: None.     Estimated Blood Loss: <30 cc     Findings: L3-5 DDD, see dictation.     Complications: None.          5/30/2017 1:02 PM Bryan Costa

## 2017-05-30 NOTE — IP AVS SNAPSHOT
6/1/2017    Antoni Shirley  [phone]  [place] [address]    Dear Antoni:    [facility] wants to ensure your discharge home is safe and you or your loved ones have had all of your questions answered regarding your care after you leave the hospital.    Below is a list of resources and contact information should you have any questions regarding your hospital stay, follow-up instructions, or active medical symptoms.    Questions or Concerns Regarding… Contact   Medical Questions Related to Your Discharge  (7 days a week, 8am-5pm) Contact a Nurse Care Coordinator   [phone]   Medical Questions Not Related to Your Discharge  (24 hours a day / 7 days a week)  Contact the Nurse Health Line   [phone]    Medications or Discharge Instructions Refer to your discharge packet   or contact your [facility] Primary Care Provider   [phone]   Follow-up Appointment(s) Schedule your appointment via Basic6   or contact Scheduling [phone]   Billing Review your statement via Basic6  or contact Billing [phone]   Medical Records Review your records via Basic6   or contact Medical Records [phone]     You may receive a telephone call within two days of discharge. This call is to make certain you understand your discharge instructions and have the opportunity to have any questions answered. You can also easily access your medical information, test results and upcoming appointments via the Basic6 free online health management tool. You can learn more and sign up at [email or web address]. For assistance setting up your Basic6 account, please call [phone].    Once again, we want to ensure your discharge home is safe and that you have a clear understanding of any next steps in your care. If you have any questions or concerns, please do not hesitate to contact us, we are here for you. Thank you for choosing [facility] for your healthcare needs.    Sincerely,    Your [facility] Healthcare Team

## 2017-05-30 NOTE — IP AVS SNAPSHOT
XD Nutrition Access Code: [email or web address]  Expires: 7/1/2017 10:03 AM    Your email address is not on file at [facility].  Email Addresses are required for you to sign up for XD Nutrition, please contact [phone] to verify your personal information and to provide your email address prior to attempting to register for XD Nutrition.    Antoni Shirley  [phone] [place] [address]    XD Nutrition  A secure, online tool to manage your health information     [facility]’s XD Nutrition® is a secure, online tool that connects you to your personalized health information from the privacy of your home -- day or night - making it very easy for you to manage your healthcare. Once the activation process is completed, you can even access your medical information using the XD Nutrition yoselin, which is available for free in the Apple Yoselin store or Google Play store.     To learn more about XD Nutrition, visit [email or web address]    There are two levels of access available (as shown below):   My Chart Features  [facility] Primary Care Doctor [facility]  Specialists [facility]  Urgent  Care Non-[facility] Primary Care Doctor   Email your healthcare team securely and privately 24/7 X X X    Manage appointments: schedule your next appointment; view details of past/upcoming appointments X      Request prescription refills. X      View recent personal medical records, including lab and immunizations X X X X   View health record, including health history, allergies, medications X X X X   Read reports about your outpatient visits, procedures, consult and ER notes X X X X   See your discharge summary, which is a recap of your hospital and/or ER visit that includes your diagnosis, lab results, and care plan X X  X     How to register for XD Nutrition:  Once your e-mail address has been verified, follow the following steps to sign up for XD Nutrition.     1. Go to  [email or web address]  2. Click on the Sign Up Now box, which takes you to the New Member Sign Up page. You  will need to provide the following information:  a. Enter your [employer] Access Code exactly as it appears at the top of this page. (You will not need to use this code after you’ve completed the sign-up process. If you do not sign up before the expiration date, you must request a new code.)   b. Enter your date of birth.   c. Enter your home email address.   d. Click Submit, and follow the next screen’s instructions.  3. Create a Picklifyt ID. This will be your [employer] login ID and cannot be changed, so think of one that is secure and easy to remember.  4. Create a [employer] password. You can change your password at any time.  5. Enter your Password Reset Question and Answer. This can be used at a later time if you forget your password.   6. Enter your e-mail address. This allows you to receive e-mail notifications when new information is available in [employer].  7. Click Sign Up. You can now view your health information.    For assistance activating your [employer] account, call [phone]

## 2017-05-31 PROCEDURE — G0378 HOSPITAL OBSERVATION PER HR: HCPCS

## 2017-05-31 PROCEDURE — 700111 HCHG RX REV CODE 636 W/ 250 OVERRIDE (IP): Performed by: PHYSICIAN ASSISTANT

## 2017-05-31 PROCEDURE — 97166 OT EVAL MOD COMPLEX 45 MIN: CPT | Mod: XE

## 2017-05-31 PROCEDURE — G8979 MOBILITY GOAL STATUS: HCPCS | Mod: CI

## 2017-05-31 PROCEDURE — A9270 NON-COVERED ITEM OR SERVICE: HCPCS | Performed by: PHYSICIAN ASSISTANT

## 2017-05-31 PROCEDURE — 700101 HCHG RX REV CODE 250: Performed by: PHYSICIAN ASSISTANT

## 2017-05-31 PROCEDURE — 97162 PT EVAL MOD COMPLEX 30 MIN: CPT

## 2017-05-31 PROCEDURE — G8988 SELF CARE GOAL STATUS: HCPCS | Mod: CI

## 2017-05-31 PROCEDURE — 96376 TX/PRO/DX INJ SAME DRUG ADON: CPT

## 2017-05-31 PROCEDURE — 700112 HCHG RX REV CODE 229: Performed by: PHYSICIAN ASSISTANT

## 2017-05-31 PROCEDURE — 700102 HCHG RX REV CODE 250 W/ 637 OVERRIDE(OP): Performed by: PHYSICIAN ASSISTANT

## 2017-05-31 PROCEDURE — 97535 SELF CARE MNGMENT TRAINING: CPT

## 2017-05-31 PROCEDURE — G8978 MOBILITY CURRENT STATUS: HCPCS | Mod: CJ

## 2017-05-31 PROCEDURE — G8987 SELF CARE CURRENT STATUS: HCPCS | Mod: CJ

## 2017-05-31 RX ORDER — HYDROCODONE BITARTRATE AND ACETAMINOPHEN 5; 325 MG/1; MG/1
1-2 TABLET ORAL EVERY 4 HOURS PRN
Qty: 100 TAB | Refills: 0 | Status: SHIPPED | OUTPATIENT
Start: 2017-05-31

## 2017-05-31 RX ORDER — HYDROCODONE BITARTRATE AND ACETAMINOPHEN 5; 325 MG/1; MG/1
1-2 TABLET ORAL EVERY 4 HOURS PRN
Status: DISCONTINUED | OUTPATIENT
Start: 2017-05-31 | End: 2017-06-01 | Stop reason: HOSPADM

## 2017-05-31 RX ORDER — CEPHALEXIN 500 MG/1
500 CAPSULE ORAL EVERY 6 HOURS
Qty: 20 CAP | Refills: 0 | Status: SHIPPED | OUTPATIENT
Start: 2017-05-31 | End: 2017-06-05

## 2017-05-31 RX ORDER — TIZANIDINE 2 MG/1
2 TABLET ORAL 3 TIMES DAILY PRN
Qty: 90 TAB | Refills: 3 | Status: SHIPPED | OUTPATIENT
Start: 2017-05-31

## 2017-05-31 RX ADMIN — CARBIDOPA AND LEVODOPA 2 TABLET: 25; 100 TABLET ORAL at 16:42

## 2017-05-31 RX ADMIN — CLONAZEPAM 0.5 MG: 0.5 TABLET ORAL at 08:52

## 2017-05-31 RX ADMIN — PRAZOSIN HYDROCHLORIDE 4 MG: 2 CAPSULE ORAL at 20:03

## 2017-05-31 RX ADMIN — BUSPIRONE HYDROCHLORIDE 10 MG: 10 TABLET ORAL at 08:52

## 2017-05-31 RX ADMIN — DOCUSATE SODIUM 100 MG: 100 CAPSULE ORAL at 08:52

## 2017-05-31 RX ADMIN — LITHIUM CARBONATE 300 MG: 300 TABLET, EXTENDED RELEASE ORAL at 20:03

## 2017-05-31 RX ADMIN — PRAVASTATIN SODIUM 20 MG: 20 TABLET ORAL at 20:05

## 2017-05-31 RX ADMIN — HYDROCODONE BITARTRATE AND ACETAMINOPHEN 1 TABLET: 5; 325 TABLET ORAL at 16:41

## 2017-05-31 RX ADMIN — CARBIDOPA AND LEVODOPA 2 TABLET: 25; 100 TABLET ORAL at 08:54

## 2017-05-31 RX ADMIN — CEFAZOLIN SODIUM 2 G: 2 INJECTION, SOLUTION INTRAVENOUS at 05:06

## 2017-05-31 RX ADMIN — POTASSIUM CHLORIDE, DEXTROSE MONOHYDRATE AND SODIUM CHLORIDE: 150; 5; 450 INJECTION, SOLUTION INTRAVENOUS at 01:11

## 2017-05-31 RX ADMIN — CEPHALEXIN 500 MG: 500 CAPSULE ORAL at 16:41

## 2017-05-31 RX ADMIN — BUSPIRONE HYDROCHLORIDE 10 MG: 10 TABLET ORAL at 20:05

## 2017-05-31 RX ADMIN — CARBIDOPA AND LEVODOPA 1 TABLET: 50; 200 TABLET, EXTENDED RELEASE ORAL at 20:03

## 2017-05-31 RX ADMIN — ANTACID TABLETS 500 MG: 500 TABLET, CHEWABLE ORAL at 08:52

## 2017-05-31 RX ADMIN — LITHIUM CARBONATE 300 MG: 300 TABLET, EXTENDED RELEASE ORAL at 08:52

## 2017-05-31 RX ADMIN — CEPHALEXIN 500 MG: 500 CAPSULE ORAL at 23:27

## 2017-05-31 RX ADMIN — RISPERIDONE 0.5 MG: 0.5 TABLET, FILM COATED ORAL at 20:04

## 2017-05-31 RX ADMIN — BETAMETHASONE VALERATE 1 APPLICATION: 1 CREAM TOPICAL at 20:04

## 2017-05-31 RX ADMIN — STANDARDIZED SENNA CONCENTRATE AND DOCUSATE SODIUM 1 TABLET: 8.6; 5 TABLET, FILM COATED ORAL at 20:04

## 2017-05-31 RX ADMIN — CEPHALEXIN 500 MG: 500 CAPSULE ORAL at 05:07

## 2017-05-31 RX ADMIN — CEPHALEXIN 500 MG: 500 CAPSULE ORAL at 12:25

## 2017-05-31 RX ADMIN — OMEPRAZOLE 20 MG: 20 CAPSULE, DELAYED RELEASE ORAL at 08:52

## 2017-05-31 RX ADMIN — ANTACID TABLETS 500 MG: 500 TABLET, CHEWABLE ORAL at 20:04

## 2017-05-31 RX ADMIN — AMITRIPTYLINE HYDROCHLORIDE 25 MG: 25 TABLET, FILM COATED ORAL at 20:03

## 2017-05-31 RX ADMIN — BETAMETHASONE VALERATE 1 APPLICATION: 1 CREAM TOPICAL at 08:52

## 2017-05-31 RX ADMIN — TIZANIDINE 2 MG: 4 TABLET ORAL at 08:53

## 2017-05-31 RX ADMIN — CARBIDOPA AND LEVODOPA 2 TABLET: 25; 100 TABLET ORAL at 12:24

## 2017-05-31 RX ADMIN — DOCUSATE SODIUM 100 MG: 100 CAPSULE ORAL at 20:04

## 2017-05-31 RX ADMIN — LOSARTAN POTASSIUM 25 MG: 25 TABLET, FILM COATED ORAL at 20:03

## 2017-05-31 ASSESSMENT — COGNITIVE AND FUNCTIONAL STATUS - GENERAL
MOBILITY SCORE: 23
DRESSING REGULAR LOWER BODY CLOTHING: A LOT
SUGGESTED CMS G CODE MODIFIER MOBILITY: CI
TOILETING: A LITTLE
CLIMB 3 TO 5 STEPS WITH RAILING: A LITTLE
DAILY ACTIVITIY SCORE: 19
SUGGESTED CMS G CODE MODIFIER DAILY ACTIVITY: CK
HELP NEEDED FOR BATHING: A LITTLE
PERSONAL GROOMING: A LITTLE

## 2017-05-31 ASSESSMENT — GAIT ASSESSMENTS
ASSISTIVE DEVICE: FRONT WHEEL WALKER
DISTANCE (FEET): 250
GAIT LEVEL OF ASSIST: SUPERVISED

## 2017-05-31 ASSESSMENT — PAIN SCALES - GENERAL
PAINLEVEL_OUTOF10: 4
PAINLEVEL_OUTOF10: 1
PAINLEVEL_OUTOF10: 4
PAINLEVEL_OUTOF10: 1
PAINLEVEL_OUTOF10: 2
PAINLEVEL_OUTOF10: 1
PAINLEVEL_OUTOF10: 0

## 2017-05-31 ASSESSMENT — ACTIVITIES OF DAILY LIVING (ADL): TOILETING: INDEPENDENT

## 2017-05-31 ASSESSMENT — ENCOUNTER SYMPTOMS: FOCAL WEAKNESS: 0

## 2017-05-31 NOTE — FACE TO FACE
Face to Face Note  -  Durable Medical Equipment    Hayder Fitzpatrick PA-C - NPI: [id number]  I certify that this patient is under my care and that they had a durable medical equipment(DME)face to face encounter by myself that meets the physician DME face-to-face encounter requirements with this patient on:    Date of encounter:   Patient:                    MRN:                       YOB: 2017  Antoni Shirley  [id number]  1940     The encounter with the patient was in whole, or in part, for the following medical condition, which is the primary reason for durable medical equipment:  Post-Op Surgery    I certify that, based on my findings, the following durable medical equipment is medically necessary:  Walkers.    HOME O2 Saturation Measurements:(Values must be present for Home Oxygen orders)         ,     ,         My Clinical findings support the need for the above equipment due to:  Abnormal Gait    Supporting Symptoms: Post lumbar surgery with altered gait.

## 2017-05-31 NOTE — THERAPY
"Physical Therapy Evaluation completed.   Bed Mobility:   Up with OT   Transfers:  Sit<>stand: supervision with FWW  Gait: supervision with Front-Wheel Walker       Plan of Care: Will benefit from Physical Therapy 5 times per week  Discharge Recommendations: Equipment: Front-Wheel Walker.     Pt presents with impaired activity tolerance, balance, and gait mechanics s/p MIS L3-5. Pt is most limited by normal post operative pain and precautions as well as baseline balance deficits related to PD. Pt would benefit from home health as he lives alone but he prefers to follow up outpt PT for higher level balance when appropriate in stage of recovery. Will continue to follow while in house, anticipate functionally capable of d/c home when medically appropriate to do so.     See \"Rehab Therapy-Acute\" Patient Summary Report for complete documentation.     "

## 2017-05-31 NOTE — PROGRESS NOTES
Progress Note               Author: Hayder Fitzpatrick Date & Time created: 2017  8:49 AM     Interval History:  POD#1 MIS L3 - L5 laminotomies  Doing well.    Review of Systems:  Review of Systems   Musculoskeletal:        Minimal pain to op site.    Neurological: Negative for focal weakness.        Minimal residual numbness to toes.        Physical Exam:  Physical Exam   Musculoskeletal:   Motor 5/5.    Neurological:   Sensory intact.   Skin: Skin is dry.       Labs:        Invalid input(s): [id number]      Recent Labs      17   0845   SODIUM  140   POTASSIUM  4.2   CHLORIDE  105   CO2  27   BUN  14   CREATININE  0.87   CALCIUM  9.5     Recent Labs      17   0845   GLUCOSE  91     Recent Labs      17   0845   PROTHROMBTM  13.2   APTT  29.0   INR  0.97         Hemodynamics:  Temp (24hrs), Av.7 °C (98.1 °F), Min:36.4 °C (97.6 °F), Max:37.1 °C (98.7 °F)  Temperature: 36.8 °C (98.2 °F)  Pulse  Av.2  Min: 51  Max: 74Heart Rate (Monitored): (!) 42  Blood Pressure : 134/61 mmHg, NIBP: (!) 90/52 mmHg     Respiratory:    Respiration: 18, Pulse Oximetry: 93 %        RUL Breath Sounds: Clear, RML Breath Sounds: Clear, RLL Breath Sounds: Diminished, GT Breath Sounds: Clear, LLL Breath Sounds: Diminished  Fluids:    Intake/Output Summary (Last 24 hours) at 17 0849  Last data filed at 17 0600   Gross per 24 hour   Intake   1250 ml   Output    200 ml   Net   1050 ml        GI/Nutrition:  Orders Placed This Encounter   Procedures   • DIET ORDER     Standing Status: Standing      Number of Occurrences: 1      Standing Expiration Date:      Order Specific Question:  Diet:     Answer:  Regular [1]     Medical Decision Making, by Problem:  Active Hospital Problems    Diagnosis   • DDD (degenerative disc disease), lumbar [M51.36]       Plan:  Check drain at 1400  If output less than 30ml, OK for D/C home. Otherwise will D/C home with drain.     Core Measures

## 2017-05-31 NOTE — PROGRESS NOTES
INÉS drain has 50 ml drainage at 1400.  Family is uncomfortable with taking pt home, as they do not think they can get him back to PCP for drain removal tomorrow.Updated ANGIE Godoy.  Plan to hold d/c until tomorrow.

## 2017-05-31 NOTE — PROGRESS NOTES
Patient alert and oriented. Tingling to left toes that he states is improving. Denies pain. No oxygen needed at this time. 1 assist with walker. INÉS drain intact and draining. Dressing has small amount of drainage. Plan of care discussed. Patient cooperative with care. Bed alarm on. Call light within reach. Bed locked and in low position. Patient encouraged to call with any needs/concerns.

## 2017-05-31 NOTE — THERAPY
"Occupational Therapy Evaluation completed.   Functional Status:  MOD A LB dressing without AE. Supervision LB dressing with reacher and sock aide post education. Supervision for mobility with walker.     Plan of Care: Will benefit from Occupational Therapy 3 times per week  Discharge Recommendations:  Equipment: Front-Wheel Walker. Post-acute therapy Discharge to home with outpatient or home health for additional skilled therapy services.    Pt is a 77 y/o male admitted with DDD L3-5 s/p lumbar lami with diskectomy, L3-5 laminotomies. Pt lives alone and was independent with ADLS and mobility at baseline. Pt with PMH of Parkinsons, HTN, high cholesterol. Pt with h/o falls at home - possibly related to balance issues from parkinsons. Pt required MOD assist for LB dressing pre-education and supervision LB dressing post education in AE. Resource handout issued. Back precautions reviewed and handout issued. Plan for pt return to home at D/C. Will follow until d/c to maximize safety and independence in ADLS and mobility prior to home alone.     See \"Rehab Therapy-Acute\" Patient Summary Report for complete documentation.    "

## 2017-05-31 NOTE — CARE PLAN
Problem: Pain Management  Goal: Pain level will decrease to patient’s comfort goal  Routine pain assessment performed and appropriate intervention implemented to alleviate pain    Problem: Safety  Goal: Will remain free from falls  Bed alarm on, call light within reach, pt educated on importance of using call light when he wants to get out of bed, appropriate staff assistance provided when pt is up to the bathroom

## 2017-06-01 VITALS
SYSTOLIC BLOOD PRESSURE: 81 MMHG | OXYGEN SATURATION: 98 % | HEART RATE: 51 BPM | DIASTOLIC BLOOD PRESSURE: 44 MMHG | WEIGHT: 155.42 LBS | HEIGHT: 67 IN | BODY MASS INDEX: 24.39 KG/M2 | TEMPERATURE: 97.9 F | RESPIRATION RATE: 16 BRPM

## 2017-06-01 PROCEDURE — 700112 HCHG RX REV CODE 229: Performed by: PHYSICIAN ASSISTANT

## 2017-06-01 PROCEDURE — A9270 NON-COVERED ITEM OR SERVICE: HCPCS | Performed by: PHYSICIAN ASSISTANT

## 2017-06-01 PROCEDURE — 700102 HCHG RX REV CODE 250 W/ 637 OVERRIDE(OP): Performed by: PHYSICIAN ASSISTANT

## 2017-06-01 PROCEDURE — G0378 HOSPITAL OBSERVATION PER HR: HCPCS

## 2017-06-01 RX ADMIN — ANTACID TABLETS 500 MG: 500 TABLET, CHEWABLE ORAL at 10:25

## 2017-06-01 RX ADMIN — DOCUSATE SODIUM 100 MG: 100 CAPSULE ORAL at 10:25

## 2017-06-01 RX ADMIN — OMEPRAZOLE 20 MG: 20 CAPSULE, DELAYED RELEASE ORAL at 10:26

## 2017-06-01 RX ADMIN — BETAMETHASONE VALERATE 1 APPLICATION: 1 CREAM TOPICAL at 10:25

## 2017-06-01 RX ADMIN — LITHIUM CARBONATE 300 MG: 300 TABLET, EXTENDED RELEASE ORAL at 10:26

## 2017-06-01 RX ADMIN — CEPHALEXIN 500 MG: 500 CAPSULE ORAL at 10:57

## 2017-06-01 RX ADMIN — CEPHALEXIN 500 MG: 500 CAPSULE ORAL at 05:40

## 2017-06-01 RX ADMIN — CARBIDOPA AND LEVODOPA 2 TABLET: 25; 100 TABLET ORAL at 08:26

## 2017-06-01 RX ADMIN — CARBIDOPA AND LEVODOPA 2 TABLET: 25; 100 TABLET ORAL at 10:30

## 2017-06-01 RX ADMIN — BUSPIRONE HYDROCHLORIDE 10 MG: 10 TABLET ORAL at 10:26

## 2017-06-01 RX ADMIN — CLONAZEPAM 0.5 MG: 0.5 TABLET ORAL at 10:26

## 2017-06-01 RX ADMIN — HYDROCODONE BITARTRATE AND ACETAMINOPHEN 1 TABLET: 10; 325 TABLET ORAL at 07:14

## 2017-06-01 ASSESSMENT — LIFESTYLE VARIABLES
HAVE YOU EVER FELT YOU SHOULD CUT DOWN ON YOUR DRINKING: NO
EVER FELT BAD OR GUILTY ABOUT YOUR DRINKING: NO
EVER HAD A DRINK FIRST THING IN THE MORNING TO STEADY YOUR NERVES TO GET RID OF A HANGOVER: NO
CONSUMPTION TOTAL: NEGATIVE
DO YOU DRINK ALCOHOL: YES
TOTAL SCORE: 0
HAVE PEOPLE ANNOYED YOU BY CRITICIZING YOUR DRINKING: NO
HOW MANY TIMES IN THE PAST YEAR HAVE YOU HAD 5 OR MORE DRINKS IN A DAY: 0
AVERAGE NUMBER OF DAYS PER WEEK YOU HAVE A DRINK CONTAINING ALCOHOL: 7
TOTAL SCORE: 0
TOTAL SCORE: 0
ON A TYPICAL DAY WHEN YOU DRINK ALCOHOL HOW MANY DRINKS DO YOU HAVE: 2

## 2017-06-01 ASSESSMENT — ENCOUNTER SYMPTOMS: FOCAL WEAKNESS: 0

## 2017-06-01 ASSESSMENT — PAIN SCALES - GENERAL
PAINLEVEL_OUTOF10: 7
PAINLEVEL_OUTOF10: 0

## 2017-06-01 NOTE — PROGRESS NOTES
Progress Note               Author: Hayder Fitzpatrick Date & Time created: 2017  8:38 AM     Interval History:  POD#2 MIS L3 - L5 laminotomies  Doing well.  Drain output has diminished  Had 1 episode of low bp which resolved, currently asymptomatic    Review of Systems:  Review of Systems   Musculoskeletal:        Minimal pain to op site.    Neurological: Negative for focal weakness.        Minimal residual numbness to toes.        Physical Exam:  Physical Exam   Musculoskeletal:   Motor 5/5.    Neurological:   Sensory intact.   Skin: Skin is dry.       Labs:        Invalid input(s): [id number]      Recent Labs      17   0845   SODIUM  140   POTASSIUM  4.2   CHLORIDE  105   CO2  27   BUN  14   CREATININE  0.87   CALCIUM  9.5     Recent Labs      17   0845   GLUCOSE  91     Recent Labs      17   0845   PROTHROMBTM  13.2   APTT  29.0   INR  0.97         Hemodynamics:  Temp (24hrs), Av.8 °C (98.2 °F), Min:36.6 °C (97.9 °F), Max:37 °C (98.6 °F)  Temperature: 36.6 °C (97.9 °F)  Pulse  Av.6  Min: 51  Max: 74  Blood Pressure : (!) 81/44 mmHg (notified  Rn)     Respiratory:    Respiration: 16, Pulse Oximetry: 98 %        RUL Breath Sounds: Clear, RML Breath Sounds: Clear, RLL Breath Sounds: Diminished, GT Breath Sounds: Clear, LLL Breath Sounds: Diminished  Fluids:    Intake/Output Summary (Last 24 hours) at 17 0849  Last data filed at 17 0600   Gross per 24 hour   Intake   1250 ml   Output    200 ml   Net   1050 ml        GI/Nutrition:  Orders Placed This Encounter   Procedures   • DIET ORDER     Standing Status: Standing      Number of Occurrences: 1      Standing Expiration Date:      Order Specific Question:  Diet:     Answer:  Regular [1]     Medical Decision Making, by Problem:  Active Hospital Problems    Diagnosis   • DDD (degenerative disc disease), lumbar [M51.36]       Plan:  Dc home  rx on chart  Discharge instructions given again    Core Measures

## 2017-06-01 NOTE — DISCHARGE SUMMARY
ADDENDUM    DATE OF ADMISSION:  05/30/2017    DATE OF DISCHARGE:  06/01/2017    DISCHARGE DIAGNOSES:  1.  L3 through L5 lumbar spinal stenosis.  2.  Neurogenic claudication.  3.  Failed conservative therapies.  4.  Status post minimally invasive L3 through L5 laminotomy performed on   05/30/2017 by Dr. Ovalles.    HOSPITAL COURSE:  The patient was tentatively scheduled for discharge   yesterday.  Please see discharge summary from yesterday.  Unfortunately, his   drain output remained high and he therefore was kept overnight for continued   monitoring as his PCP was not willing to remove his drain and he lives over   150 miles away.  Today, he is doing well.  His drain output has diminished to   an acceptable level.  His incision is clean, dry, and intact.  Neurologically,   he is intact.  He has no significant leg pain and some mild residual   numbness.    DISCHARGE INSTRUCTIONS:  Per yesterday's discharge summary.  All questions are   answered.       ____________________________________     ALBERTO Seymour / LILIANA    DD:  06/01/2017 08:41:33  DT:  06/01/2017 09:49:42    D#:  [id number]  Job#:  [id number]

## 2017-06-01 NOTE — PROGRESS NOTES
Pt BP improved within the hour of being supine and he tolerated ambulation of 50 feet.  He demonstrates appropriate use of walker.  RN provided written discharge teaching, including pan for 2 week FU with his PCP, and 6 week FU with Dr. Ovalles's office.  RN provided medication instruction and prescriptions, including schedule of next doses due.    Pt verbalized understanding of all instructions and was wheeled out in WC with hospital transporter and family friend to drive him home.

## 2017-06-01 NOTE — PROGRESS NOTES
Accepted care of pt from NOC RN.  PT sitting in chair, appeared pale and c/o increasing pain in L hip and back.  Pain seemed intolerable for seated position.  CNA had reported a BP of 81/44 a few minutes prior.  RN checked BP, was 88/42 on manual cuff.  Assisted pt to bad, placed supine.  BP increased to 99/49,      Pt continues to have INÉS drain, as his draiage q 8 hours was 35 ml, then 50 ml.      Instructed pt to use call light for assist and to remain in bed for a bit.  Noc RN reports having given pt a norco 10 mg at 0715.  Prior to that dose, he had pako received 1 5 mg norco in the evening of 5/31 and tolerated that well.

## 2017-06-01 NOTE — CARE PLAN
Problem: Safety  Goal: Will remain free from injury  Outcome: PROGRESSING AS EXPECTED  INÉS drain remains in place, as pt continues to have output > 30.  Plan to monitor overnight and d/c when < 30 ml in 8 hours.  PT offered chance to go home and have PCP remove drain tomorrow, but pt did not have transportation to PCP office.  Pt agreeable to stay one more night.  HE is using call light appropriately and verbalizes understanding of spinal precautions.    Problem: Infection  Goal: Will remain free from infection  Outcome: PROGRESSING AS EXPECTED    Problem: Venous Thromboembolism (VTW)/Deep Vein Thrombosis (DVT) Prevention:  Goal: Patient will participate in Venous Thrombosis (VTE)/Deep Vein Thrombosis (DVT)Prevention Measures  Outcome: PROGRESSING AS EXPECTED  Educating pt about importance of ambulation to prevent blood clots.  PT wearing SCDS when in bed for extended amount of time.

## 2017-06-01 NOTE — DISCHARGE INSTRUCTIONS
Discharge Instructions    Discharged to home by car with relative. Discharged via wheelchair, hospital escort: Yes.  Special equipment needed: Walker      Be sure to schedule a follow-up appointment with your primary care doctor or any specialists as instructed.     Discharge Plan:   Ambulate as tolerated.    Avoid pushing, pulling or lifting greater than 15 pounds.    Okay to shower, but do not submerge the wound.   Driving permitted 2 weeks after surgery.    Follow-up appointment with [facility] on July 11, 2017 at 12:30.  Follow-up with primary care physician in 2 weeks for incision check.  Contact our office at any point should you have any questions or concerns.    Incision Care  An incision is when a surgeon cuts into your body. After surgery, the incision needs to be cared for properly to prevent infection.   HOW TO CARE FOR YOUR INCISION  · Take medicines only as directed by your health care provider.  · There are many different ways to close and cover an incision, including stitches, skin glue, and adhesive strips. Follow your health care provider's instructions on:  ¨ Incision care.  ¨ Bandage (dressing) changes and removal.  ¨ Incision closure removal.  · Do not take baths, swim, or use a hot tub until your health care provider approves. You may shower as directed by your health care provider.  · Resume your normal diet and activities as directed.  · Use anti-itch medicine (such as an antihistamine) as directed by your health care provider. The incision may itch while it is healing. Do not pick or scratch at the incision.  · Drink enough fluid to keep your urine clear or pale yellow.  SEEK MEDICAL CARE IF:   · You have drainage, redness, swelling, or pain at your incision site.  · You have muscle aches, chills, or a general ill feeling.  · You notice a bad smell coming from the incision or dressing.  · Your incision edges separate after the sutures, staples, or skin adhesive strips have been  removed.  · You have persistent nausea or vomiting.  · You have a fever.  · You are dizzy.  SEEK IMMEDIATE MEDICAL CARE IF:   · You have a rash.  · You faint.  · You have difficulty breathing.  MAKE SURE YOU:   · Understand these instructions.  · Will watch your condition.  · Will get help right away if you are not doing well or get worse.     This information is not intended to replace advice given to you by your health care provider. Make sure you discuss any questions you have with your health care provider.     Document Released: 07/07/2006 Document Revised: 01/08/2016 Document Reviewed: 02/11/2015  [employer] Interactive Patient Education ©2016 [employer]         I understand that a diet low in cholesterol, fat, and sodium is recommended for good health. Unless I have been given specific instructions below for another diet, I accept this instruction as my diet prescription.       Special Instructions: None    · Is patient discharged on Warfarin / Coumadin?   No     · Is patient Post Blood Transfusion?  No    Depression / Suicide Risk    As you are discharged from this [facility] facility, it is important to learn how to keep safe from harming yourself.    Recognize the warning signs:  · Abrupt changes in personality, positive or negative- including increase in energy   · Giving away possessions  · Change in eating patterns- significant weight changes-  positive or negative  · Change in sleeping patterns- unable to sleep or sleeping all the time   · Unwillingness or inability to communicate  · Depression  · Unusual sadness, discouragement and loneliness  · Talk of wanting to die  · Neglect of personal appearance   · Rebelliousness- reckless behavior  · Withdrawal from people/activities they love  · Confusion- inability to concentrate     If you or a loved one observes any of these behaviors or has concerns about self-harm, here's what you can do:  · Talk about it- your feelings and reasons for harming  yourself  · Remove any means that you might use to hurt yourself (examples: pills, rope, extension cords, firearm)  · Get professional help from the community (Mental Health, Substance Abuse, psychological counseling)  · Do not be alone:Call your Safe Contact- someone whom you trust who will be there for you.  · Call your local CRISIS HOTLINE 102-8042 or 576-101-3151  · Call your local Children's Mobile Crisis Response Team [place] (935) 824-1228 or www.[employer]  · Call the toll free National Suicide Prevention Hotlines   · National Suicide Prevention Lifeline 363-300-WPEM (7840)  · National Hope Line Network 800-SUICIDE (526-5067)

## 2017-06-01 NOTE — DISCHARGE SUMMARY
DATE OF ADMISSION:  05/30/2017    DATE OF DISCHARGE:  05/31/2017    DISCHARGE DIAGNOSES:  1.  L3 through L5 lumbar spinal stenosis.  2.  Neurogenic claudication.  3.  Failed conservative therapies.    SURGERY PERFORMED:  Minimally invasive L3 through L5 laminotomy performed on   05/30/2017 by Dr. Ant Ovalles.    COMPLICATIONS:  None.    REASON FOR ADMISSION:  This is a 76-year-old gentleman with history of lower   back pain with bilateral lower extremity pain and symptoms of neurogenic   claudication.  His workup did reveal L3 through L5 lumbar spinal stenosis.  He   failed to improve with conservative measures.  He was indicated for surgical   decompression.    HOSPITAL COURSE:  The patient was admitted on the date of surgery.  He   underwent surgery without complication.  After recovery, he was transferred to   the neurosciences unit.  By postoperative day #1, he is ambulating   independently with a front wheel walker.  His pain is well controlled and his   preoperative deficits have resolved, and at this point, he is now cleared for   discharge home after uneventful hospital stay.    INSTRUCTIONS ON DISCHARGE:  Patient is to ambulate as tolerated.  He is to   avoid pushing, pulling or lifting greater than 15 pounds.  It is okay for him   to shower.  He is not to submerge the wound.  It will be okay for him to drive   in 2 weeks' time.  He is comfortable not taking narcotic pain medications.    He does have an appointment scheduled in the office of [facility] in 2 weeks' time.  He will contact our office at any   point should he have any questions or concerns.    DISCHARGE MEDICATIONS:  In addition to usual his home medications, the patient   is discharged home on [place] 5/325, #100, 1-2 p.o. q. 4 hours p.r.n. for pain,   Zanaflex 2 mg 1 p.o. q. 8 hours p.r.n. for spasm, and Keflex 500 mg, #20, 1   p.o. q.i.d. for 5 days.    All the patient's questions have been answered.  He  is discharged home in   stable condition.       ____________________________________     ALBERTO COTTO    DD:  05/31/2017 09:01:49  DT:  05/31/2017 18:26:22    D#:  [id number]  Job#:  [id number]

## 2017-06-11 NOTE — OP REPORT
DATE OF SERVICE:  05/30/2017    PREOPERATIVE DIAGNOSES:  1.  Bilateral neurogenic claudication.  2.  L3-S1 lumbar stenosis with bilateral recess stenosis.  3.  Stable grade I L4-L5 spondylolisthesis.  4.  Failed conservative care.    POSTOPERATIVE DIAGNOSES:  1.  Bilateral neurogenic claudication.  2.  L3-S1 lumbar stenosis with bilateral recess stenosis.  3.  Stable grade I L4-L5 spondylolisthesis.  4.  Failed conservative care.    PROCEDURES PERFORMED:  Minimally invasive approach with TSI retractor system.  1.  Bilateral L3 laminotomies and foraminotomies, decompression bilateral L3   nerve roots.  2.  Bilateral L4 laminotomies and foraminotomies, decompression bilateral L4   nerve roots.  3.  Bilateral L5 laminotomies and foraminotomies, decompression bilateral L5   nerve roots.  4.  Bilateral S1 laminotomies and foraminotomies, decompression bilateral S1   nerve roots.  5.  Left L4 transpedicular approach with far lateral decompression, left L4   nerve root.  6.  Left L5 transpedicular approach with far lateral decompression, left L5   nerve root.  7.  Microscope for microdissection of spinal canal.  8.  SSEP, EMG monitoring performed by neuro monitoring associates, which   remained stable throughout.    SURGEON:  Ant Ovalles MD, neurosurgery-spine surgery    ASSISTANT:  Bryan Costa PA-C    ANESTHESIA:  General endotracheal.    ANESTHESIOLOGIST:  Tulio Jesus MD    COMPLICATIONS:  None.    ESTIMATED BLOOD LOSS:  Less than 100 mL    PREOPERATIVE NOTE:  This is an extremely pleasant 76-year-old male who   presents with bilateral neurogenic claudication.  MRI showed predominantly   L3-S1 lumbar stenosis, bilateral recess stenosis.  Patient failed epidural   injections and physical therapy.  Given the patient's neurological deficits,   the patient's neurogenic claudication, severe stenosis and his failure to   improve with conservative care, I offered the patient the above-listed   minimally invasive  approach.  This was more important given the patient had   severe stenosis, particularly at L4-L5 with a grade I spondylolisthesis that   is stable and hence, I felt that fusion was not required and a minimally   invasive approach is far less destructive for the patient overall and it would   create more stability long-term.  I discussed surgical procedure,   alternatives, goals, risks, benefits, and complications in detail with the   patient, used a bone model, MRI and educational handouts to assist the patient   with decision making, answered all questions to the best of my ability.    Patient understood and consented to surgery.  Details are well contained in   the office visit notes.    NARRATIVE DICTATION:  Patient was brought to the operating room, placed under   general endotracheal anesthesia.  He was placed prone on the operating OSI   table with care taken about the bony prominences and peripheral nerves.    Lumbar region was prepped and draped in usual sterile fashion.  Following   localization with cross table fluoroscopy, a small incision made to the left   of midline and the fascia was incised.  I used the serial dilator tubes for   muscle splitting approach and docked on the left L4 pars.  The TSI retractor   system was placed over the dilators and excellent exposure was achieved from   L3-S1 levels.  Intraoperative x-ray confirmed appropriate levels.    Using [employer] AM-8 drillbit, Kerrison rongeurs and curettes, left L3   laminotomy and foraminotomy was completed, left L4 laminotomy and foraminotomy   was completed, left L5 laminotomy and foraminotomy was completed, marked   facet and ligamentous hypertrophy was undercut and removed, left S1 laminotomy   and foraminotomy was completed.  The thecal sac was nice and freed up once   the marked ligamentum was removed and the medial facet was drilled down.    Microscope was used for microdissection of spinal canal.  On the microscope, I   drilled down  the left L4 pedicle and left L5 pedicle for transpedicular   decompression of left L4-L5 nerve root complexes.  This required extra degree   of expertise, effort and time, and hence, a modifier-59 was appropriate.  This   was the most stenotic level and hence, opening up the foramen widely opened   up the thecal sac nicely and this avoided needing to perform open   laminectomies or fusion in deed given his grade I spondylolisthesis.  Thecal   sac was nice and freed up throughout.  The microscope was used for   microdissection of spinal canal to assist with this throughout.  I inspected   the disk spaces, no disk herniations were seen.  The wound was irrigated with   bacitracin irrigation.  An epidural was placed with Marcaine and fentanyl for   postoperative analgesia, and the muscle was infiltrated with Marcaine   analgesia and then closed over a drain brought through a separate incision   with 0 Vicryl deep fascia, 2-0 Vicryl deep dermal layer, Steri-Strips to skin.    Small sterile dressing was applied.  Swabs, needles, instruments were   correct by 2 count.  No complications were encountered.  Patient tolerated the   procedure, stable, and transferred to recovery.  Patient will be observed at   [facility] until he meets discharge criteria.    INTRAOPERATIVE FINDINGS:  Severe stenosis at L3-S1 levels, marked facet and   ligamentous hypertrophy, which was undercut and removed via minimally invasive   approach.  Patient was neurologically intact in the recovery room.    Patient will follow up at [facility] [place] [facility] in 2   weeks and 6 weeks' time as arranged.       ____________________________________     MD CHOLO RUTHERFORD / LILIANA    DD:  06/11/2017 10:32:51  DT:  06/11/2017 10:55:20    D#:  [id number]  Job#:  [id number]    cc: Mary Earl

## 2017-11-15 ENCOUNTER — RESOLUTE PROFESSIONAL BILLING HOSPITAL PROF FEE (OUTPATIENT)
Dept: HOSPITALIST | Facility: MEDICAL CENTER | Age: 77
End: 2017-11-15
Payer: COMMERCIAL

## 2017-11-15 ENCOUNTER — HOSPITAL ENCOUNTER (OUTPATIENT)
Dept: RADIOLOGY | Facility: MEDICAL CENTER | Age: 77
End: 2017-11-15

## 2017-11-15 ENCOUNTER — HOSPITAL ENCOUNTER (OUTPATIENT)
Facility: MEDICAL CENTER | Age: 77
End: 2017-11-19
Attending: EMERGENCY MEDICINE | Admitting: HOSPITALIST
Payer: COMMERCIAL

## 2017-11-15 ENCOUNTER — APPOINTMENT (OUTPATIENT)
Dept: RADIOLOGY | Facility: MEDICAL CENTER | Age: 77
End: 2017-11-15
Attending: EMERGENCY MEDICINE
Payer: COMMERCIAL

## 2017-11-15 DIAGNOSIS — F31.9 BIPOLAR 1 DISORDER (HCC): ICD-10-CM

## 2017-11-15 DIAGNOSIS — R27.0 ATAXIA: ICD-10-CM

## 2017-11-15 DIAGNOSIS — F20.9 SCHIZOPHRENIA, UNSPECIFIED TYPE (HCC): ICD-10-CM

## 2017-11-15 DIAGNOSIS — G20.A1 PARKINSON DISEASE: ICD-10-CM

## 2017-11-15 PROBLEM — R20.0 NUMBNESS AND TINGLING IN BOTH HANDS: Status: ACTIVE | Noted: 2017-11-15

## 2017-11-15 PROBLEM — R20.2 NUMBNESS AND TINGLING IN BOTH HANDS: Status: ACTIVE | Noted: 2017-11-15

## 2017-11-15 PROBLEM — F17.200 SMOKER: Status: ACTIVE | Noted: 2017-11-15

## 2017-11-15 LAB
GFR SERPL CREATININE-BSD FRML MDRD: >60 ML/MIN/1.73 M 2
LITHIUM SERPL-MCNC: 0.8 MMOL/L (ref 0.6–1.2)
MAGNESIUM SERPL-MCNC: 2 MG/DL (ref 1.5–2.5)
TSH SERPL DL<=0.005 MIU/L-ACNC: 1.12 UIU/ML (ref 0.3–3.7)

## 2017-11-15 PROCEDURE — 36415 COLL VENOUS BLD VENIPUNCTURE: CPT

## 2017-11-15 PROCEDURE — 700117 HCHG RX CONTRAST REV CODE 255: Performed by: EMERGENCY MEDICINE

## 2017-11-15 PROCEDURE — G8987 SELF CARE CURRENT STATUS: HCPCS | Mod: CJ

## 2017-11-15 PROCEDURE — A9270 NON-COVERED ITEM OR SERVICE: HCPCS | Performed by: HOSPITALIST

## 2017-11-15 PROCEDURE — 96375 TX/PRO/DX INJ NEW DRUG ADDON: CPT

## 2017-11-15 PROCEDURE — 96374 THER/PROPH/DIAG INJ IV PUSH: CPT

## 2017-11-15 PROCEDURE — 700102 HCHG RX REV CODE 250 W/ 637 OVERRIDE(OP): Performed by: HOSPITALIST

## 2017-11-15 PROCEDURE — 70496 CT ANGIOGRAPHY HEAD: CPT

## 2017-11-15 PROCEDURE — 70498 CT ANGIOGRAPHY NECK: CPT

## 2017-11-15 PROCEDURE — 80053 COMPREHEN METABOLIC PANEL: CPT

## 2017-11-15 PROCEDURE — 99285 EMERGENCY DEPT VISIT HI MDM: CPT

## 2017-11-15 PROCEDURE — 700111 HCHG RX REV CODE 636 W/ 250 OVERRIDE (IP): Performed by: EMERGENCY MEDICINE

## 2017-11-15 PROCEDURE — 84484 ASSAY OF TROPONIN QUANT: CPT

## 2017-11-15 PROCEDURE — 99219 PR INITIAL OBSERVATION CARE,LEVL II: CPT | Performed by: HOSPITALIST

## 2017-11-15 PROCEDURE — 85025 COMPLETE CBC W/AUTO DIFF WBC: CPT

## 2017-11-15 PROCEDURE — 700111 HCHG RX REV CODE 636 W/ 250 OVERRIDE (IP): Performed by: HOSPITALIST

## 2017-11-15 PROCEDURE — 70450 CT HEAD/BRAIN W/O DYE: CPT

## 2017-11-15 PROCEDURE — 80178 ASSAY OF LITHIUM: CPT

## 2017-11-15 PROCEDURE — G0378 HOSPITAL OBSERVATION PER HR: HCPCS

## 2017-11-15 PROCEDURE — 97162 PT EVAL MOD COMPLEX 30 MIN: CPT

## 2017-11-15 PROCEDURE — 84443 ASSAY THYROID STIM HORMONE: CPT

## 2017-11-15 PROCEDURE — G8979 MOBILITY GOAL STATUS: HCPCS | Mod: CI

## 2017-11-15 PROCEDURE — G8988 SELF CARE GOAL STATUS: HCPCS | Mod: CI

## 2017-11-15 PROCEDURE — 83735 ASSAY OF MAGNESIUM: CPT

## 2017-11-15 PROCEDURE — 83880 ASSAY OF NATRIURETIC PEPTIDE: CPT

## 2017-11-15 PROCEDURE — G8978 MOBILITY CURRENT STATUS: HCPCS | Mod: CJ

## 2017-11-15 PROCEDURE — 700102 HCHG RX REV CODE 250 W/ 637 OVERRIDE(OP): Performed by: EMERGENCY MEDICINE

## 2017-11-15 PROCEDURE — 97166 OT EVAL MOD COMPLEX 45 MIN: CPT

## 2017-11-15 PROCEDURE — A9270 NON-COVERED ITEM OR SERVICE: HCPCS | Performed by: EMERGENCY MEDICINE

## 2017-11-15 RX ORDER — HALOPERIDOL 5 MG/ML
5 INJECTION INTRAMUSCULAR EVERY 4 HOURS PRN
Status: DISCONTINUED | OUTPATIENT
Start: 2017-11-15 | End: 2017-11-19 | Stop reason: HOSPADM

## 2017-11-15 RX ORDER — HYDROCODONE BITARTRATE AND ACETAMINOPHEN 5; 325 MG/1; MG/1
1-2 TABLET ORAL EVERY 4 HOURS PRN
Status: DISCONTINUED | OUTPATIENT
Start: 2017-11-15 | End: 2017-11-19 | Stop reason: HOSPADM

## 2017-11-15 RX ORDER — AMITRIPTYLINE HYDROCHLORIDE 25 MG/1
25 TABLET, FILM COATED ORAL
Status: DISCONTINUED | OUTPATIENT
Start: 2017-11-15 | End: 2017-11-19 | Stop reason: HOSPADM

## 2017-11-15 RX ORDER — AMOXICILLIN 250 MG
2 CAPSULE ORAL 2 TIMES DAILY
Status: DISCONTINUED | OUTPATIENT
Start: 2017-11-15 | End: 2017-11-19 | Stop reason: HOSPADM

## 2017-11-15 RX ORDER — NICOTINE 21 MG/24HR
1 PATCH, TRANSDERMAL 24 HOURS TRANSDERMAL ONCE
Status: DISCONTINUED | OUTPATIENT
Start: 2017-11-15 | End: 2017-11-15

## 2017-11-15 RX ORDER — LITHIUM CARBONATE 300 MG/1
300 TABLET, FILM COATED, EXTENDED RELEASE ORAL 2 TIMES DAILY
Status: DISCONTINUED | OUTPATIENT
Start: 2017-11-15 | End: 2017-11-19 | Stop reason: HOSPADM

## 2017-11-15 RX ORDER — BUSPIRONE HYDROCHLORIDE 10 MG/1
10 TABLET ORAL 2 TIMES DAILY
Status: DISCONTINUED | OUTPATIENT
Start: 2017-11-15 | End: 2017-11-19 | Stop reason: HOSPADM

## 2017-11-15 RX ORDER — TIZANIDINE 4 MG/1
2 TABLET ORAL 3 TIMES DAILY PRN
Status: DISCONTINUED | OUTPATIENT
Start: 2017-11-15 | End: 2017-11-19 | Stop reason: HOSPADM

## 2017-11-15 RX ORDER — BISACODYL 10 MG
10 SUPPOSITORY, RECTAL RECTAL
Status: DISCONTINUED | OUTPATIENT
Start: 2017-11-15 | End: 2017-11-19 | Stop reason: HOSPADM

## 2017-11-15 RX ORDER — ACETAMINOPHEN 325 MG/1
650 TABLET ORAL EVERY 6 HOURS PRN
Status: DISCONTINUED | OUTPATIENT
Start: 2017-11-15 | End: 2017-11-19 | Stop reason: HOSPADM

## 2017-11-15 RX ORDER — CLONAZEPAM 0.5 MG/1
0.5 TABLET ORAL DAILY
Status: DISCONTINUED | OUTPATIENT
Start: 2017-11-15 | End: 2017-11-19 | Stop reason: HOSPADM

## 2017-11-15 RX ORDER — RISPERIDONE 0.5 MG/1
0.5 TABLET ORAL
Status: DISCONTINUED | OUTPATIENT
Start: 2017-11-15 | End: 2017-11-19 | Stop reason: HOSPADM

## 2017-11-15 RX ORDER — ONDANSETRON 4 MG/1
4 TABLET, ORALLY DISINTEGRATING ORAL EVERY 4 HOURS PRN
Status: DISCONTINUED | OUTPATIENT
Start: 2017-11-15 | End: 2017-11-19 | Stop reason: HOSPADM

## 2017-11-15 RX ORDER — OMEPRAZOLE 20 MG/1
20 CAPSULE, DELAYED RELEASE ORAL DAILY
Status: DISCONTINUED | OUTPATIENT
Start: 2017-11-15 | End: 2017-11-19 | Stop reason: HOSPADM

## 2017-11-15 RX ORDER — LORAZEPAM 2 MG/ML
1 INJECTION INTRAMUSCULAR ONCE
Status: COMPLETED | OUTPATIENT
Start: 2017-11-15 | End: 2017-11-15

## 2017-11-15 RX ORDER — LOSARTAN POTASSIUM 50 MG/1
25 TABLET ORAL EVERY EVENING
Status: DISCONTINUED | OUTPATIENT
Start: 2017-11-15 | End: 2017-11-16

## 2017-11-15 RX ORDER — PRAZOSIN HYDROCHLORIDE 2 MG/1
4 CAPSULE ORAL
Status: DISCONTINUED | OUTPATIENT
Start: 2017-11-15 | End: 2017-11-19 | Stop reason: HOSPADM

## 2017-11-15 RX ORDER — HALOPERIDOL 5 MG/ML
1 INJECTION INTRAMUSCULAR ONCE
Status: COMPLETED | OUTPATIENT
Start: 2017-11-15 | End: 2017-11-15

## 2017-11-15 RX ORDER — CLONAZEPAM 0.5 MG/1
0.5 TABLET ORAL ONCE
Status: DISCONTINUED | OUTPATIENT
Start: 2017-11-15 | End: 2017-11-15

## 2017-11-15 RX ORDER — ONDANSETRON 2 MG/ML
4 INJECTION INTRAMUSCULAR; INTRAVENOUS EVERY 4 HOURS PRN
Status: DISCONTINUED | OUTPATIENT
Start: 2017-11-15 | End: 2017-11-19 | Stop reason: HOSPADM

## 2017-11-15 RX ORDER — LORAZEPAM 2 MG/ML
1 INJECTION INTRAMUSCULAR ONCE
Status: DISCONTINUED | OUTPATIENT
Start: 2017-11-15 | End: 2017-11-15

## 2017-11-15 RX ORDER — CARBIDOPA AND LEVODOPA 50; 200 MG/1; MG/1
1 TABLET, EXTENDED RELEASE ORAL
Status: DISCONTINUED | OUTPATIENT
Start: 2017-11-15 | End: 2017-11-19 | Stop reason: HOSPADM

## 2017-11-15 RX ORDER — PRAVASTATIN SODIUM 20 MG
20 TABLET ORAL NIGHTLY
Status: DISCONTINUED | OUTPATIENT
Start: 2017-11-15 | End: 2017-11-19 | Stop reason: HOSPADM

## 2017-11-15 RX ORDER — POLYETHYLENE GLYCOL 3350 17 G/17G
1 POWDER, FOR SOLUTION ORAL
Status: DISCONTINUED | OUTPATIENT
Start: 2017-11-15 | End: 2017-11-19 | Stop reason: HOSPADM

## 2017-11-15 RX ORDER — NICOTINE 21 MG/24HR
14 PATCH, TRANSDERMAL 24 HOURS TRANSDERMAL
Status: DISCONTINUED | OUTPATIENT
Start: 2017-11-15 | End: 2017-11-19 | Stop reason: HOSPADM

## 2017-11-15 RX ADMIN — LORAZEPAM 1 MG: 2 INJECTION INTRAMUSCULAR; INTRAVENOUS at 03:55

## 2017-11-15 RX ADMIN — CLONAZEPAM 0.5 MG: 0.5 TABLET ORAL at 10:09

## 2017-11-15 RX ADMIN — HALOPERIDOL LACTATE 1 MG: 5 INJECTION, SOLUTION INTRAMUSCULAR at 05:00

## 2017-11-15 RX ADMIN — STANDARDIZED SENNA CONCENTRATE AND DOCUSATE SODIUM 2 TABLET: 8.6; 5 TABLET, FILM COATED ORAL at 10:11

## 2017-11-15 RX ADMIN — OMEPRAZOLE 20 MG: 20 CAPSULE, DELAYED RELEASE ORAL at 10:09

## 2017-11-15 RX ADMIN — IOHEXOL 100 ML: 350 INJECTION, SOLUTION INTRAVENOUS at 03:33

## 2017-11-15 RX ADMIN — LITHIUM CARBONATE 300 MG: 300 TABLET, EXTENDED RELEASE ORAL at 10:09

## 2017-11-15 RX ADMIN — ASPIRIN 81 MG: 81 TABLET, COATED ORAL at 10:09

## 2017-11-15 RX ADMIN — NICOTINE 1 PATCH: 21 PATCH TRANSDERMAL at 04:15

## 2017-11-15 RX ADMIN — VITAMIN D, TAB 1000IU (100/BT) 1000 UNITS: 25 TAB at 10:11

## 2017-11-15 RX ADMIN — CARBIDOPA AND LEVODOPA 2 TABLET: 25; 100 TABLET ORAL at 10:12

## 2017-11-15 RX ADMIN — CARBIDOPA AND LEVODOPA 2 TABLET: 25; 100 TABLET ORAL at 17:25

## 2017-11-15 RX ADMIN — BUSPIRONE HYDROCHLORIDE 10 MG: 10 TABLET ORAL at 10:09

## 2017-11-15 ASSESSMENT — ENCOUNTER SYMPTOMS
WEAKNESS: 0
CLAUDICATION: 0
HEMOPTYSIS: 0
SPUTUM PRODUCTION: 0
NAUSEA: 0
EYE PAIN: 0
SORE THROAT: 0
CHILLS: 0
MYALGIAS: 0
BACK PAIN: 0
DIAPHORESIS: 0
VOMITING: 0
PALPITATIONS: 0
BRUISES/BLEEDS EASILY: 0
DIARRHEA: 0
LOSS OF CONSCIOUSNESS: 0
NECK PAIN: 0
HEADACHES: 0
EYE DISCHARGE: 0
CONSTIPATION: 0
DEPRESSION: 0
FEVER: 0
SHORTNESS OF BREATH: 0
FOCAL WEAKNESS: 0
ABDOMINAL PAIN: 0
WHEEZING: 0
DIZZINESS: 0
SPEECH CHANGE: 0
COUGH: 0
SENSORY CHANGE: 1

## 2017-11-15 ASSESSMENT — COPD QUESTIONNAIRES
DO YOU EVER COUGH UP ANY MUCUS OR PHLEGM?: YES, A FEW DAYS A WEEK OR MONTH
HAVE YOU SMOKED AT LEAST 100 CIGARETTES IN YOUR ENTIRE LIFE: YES
COPD SCREENING SCORE: 5
DURING THE PAST 4 WEEKS HOW MUCH DID YOU FEEL SHORT OF BREATH: NONE/LITTLE OF THE TIME

## 2017-11-15 ASSESSMENT — COGNITIVE AND FUNCTIONAL STATUS - GENERAL
MOBILITY SCORE: 20
SUGGESTED CMS G CODE MODIFIER MOBILITY: CJ
MOBILITY SCORE: 22
SUGGESTED CMS G CODE MODIFIER DAILY ACTIVITY: CJ
WALKING IN HOSPITAL ROOM: A LITTLE
HELP NEEDED FOR BATHING: A LITTLE
DAILY ACTIVITIY SCORE: 24
CLIMB 3 TO 5 STEPS WITH RAILING: A LOT
WALKING IN HOSPITAL ROOM: A LITTLE
SUGGESTED CMS G CODE MODIFIER DAILY ACTIVITY: CH
DAILY ACTIVITIY SCORE: 22
DRESSING REGULAR LOWER BODY CLOTHING: A LITTLE
CLIMB 3 TO 5 STEPS WITH RAILING: A LITTLE
STANDING UP FROM CHAIR USING ARMS: A LITTLE
SUGGESTED CMS G CODE MODIFIER MOBILITY: CJ

## 2017-11-15 ASSESSMENT — LIFESTYLE VARIABLES
SUBSTANCE_ABUSE: 0
DO YOU DRINK ALCOHOL: YES
ON A TYPICAL DAY WHEN YOU DRINK ALCOHOL HOW MANY DRINKS DO YOU HAVE: 2
HOW MANY TIMES IN THE PAST YEAR HAVE YOU HAD 5 OR MORE DRINKS IN A DAY: 0
CONSUMPTION TOTAL: NEGATIVE
HAVE YOU EVER FELT YOU SHOULD CUT DOWN ON YOUR DRINKING: NO
TOTAL SCORE: 0
TOTAL SCORE: 0
HAVE PEOPLE ANNOYED YOU BY CRITICIZING YOUR DRINKING: NO
EVER FELT BAD OR GUILTY ABOUT YOUR DRINKING: NO
TOTAL SCORE: 0
EVER_SMOKED: YES
EVER HAD A DRINK FIRST THING IN THE MORNING TO STEADY YOUR NERVES TO GET RID OF A HANGOVER: NO
AVERAGE NUMBER OF DAYS PER WEEK YOU HAVE A DRINK CONTAINING ALCOHOL: 3

## 2017-11-15 ASSESSMENT — GAIT ASSESSMENTS
DEVIATION: ATAXIC;DECREASED BASE OF SUPPORT
GAIT LEVEL OF ASSIST: MODERATE ASSIST
DISTANCE (FEET): 250

## 2017-11-15 ASSESSMENT — PAIN SCALES - GENERAL
PAINLEVEL_OUTOF10: 0

## 2017-11-15 ASSESSMENT — ACTIVITIES OF DAILY LIVING (ADL): TOILETING: INDEPENDENT

## 2017-11-15 NOTE — PROGRESS NOTES
2 RN skin assessment completed with MARY Reinoso. Patient has multiple skin graft sites along back, buttocks, and arms. BL ears and red, blanching, flaky and small scab on left ear. On left great toe area of flaky calloused skin. Small scab on left shin. Generalized bruising on upper extremities.

## 2017-11-15 NOTE — ED NOTES
Pt Very anxious. Repeatedly asks to go and smoke. Getting very agitated and upset. ERP notified, and at bedside. Orders received.

## 2017-11-15 NOTE — LETTER
[facility] ([facility]) - 0709  EHR eReferral Notification Requirements    To be sent by secure email to [email or web address] or   by fax to [phone]       FIELDS ARE REQUIRED TO BE COMPLETED     Patient Name:  Antoni Shirley  MRN:  [id number]   Account Number: [id number]                YOB: 1940    Date Roomed in ED:    11/15/2017   3:33 AM  Date First Observation Order Placed: 11/15/2017   5:55 AM  Date First Inpatient Order Placed:        Date of Previous Admission (Needed For Readmission Reviews Only):     Discharge Date and Time (if applicable): No discharge date for patient encounter.     PLEASE CHECK OFF TYPE OF REVIEW & CURRENT ADMISSION STATUS FROM LISTS BELOW  Type of Review:  Admission review    Dates to be Reviewed: 11/15        Current Admission Status:  Observation-Outpatient    / Contact Number for EHR outcome/recommendation call:    Rubi Honeycutt [phone]     Attending Physician/ Contact Number (if not the same as in electronic record):  Candy Ballard [phone]     Comments:  Please review per the Medicare 2MN rule      Additional Information being Emailed or Faxed:  no       Fax Handwritten Supporting Documents to EHR at [phone]      [facility]  [address] [place] [address]  [phone]  [email or web address]    Updated December 17, 2014

## 2017-11-15 NOTE — H&P
Hospital Medicine History and Physical    Date of Service  11/15/2017    Chief Complaint  Chief Complaint   Patient presents with   • Legal 2000     Pt bib ems from [facility]. Per ems pt presented to [facility]. Unable to walk in a straight line, and bumping into walls per Pt the symptoms started appx 1 hour before he presented to the hospital there.        History of Presenting Illness  77 y.o. male who presented 11/15/2017 transferred from [facility] for stroke work up.  He stated he began at 4 p.m. Yesterday with severe difficulty standing and walking after shredding papers at his home.  He states he kept falling against the wall.  He has a history of Parkinson's disease on sinemet, bipolar disorder on lithium, smoker, hyperlipidemia, chronic back pain.  No h/o prior stroke.  He drove himself to the ER and the ERP found patient ran into the wall in the [facility].  CT scan at OSH negative.  No tpa given due to prolonged time of onset prior to arrival.  He also c/o tingling in his b/l fingers and b/l toes for several months.  He states he took himself off baby Asa 2-3 months ago due to increase in bruising b/l forearms.  Primary Care Physician  Ky Bills P.A.-C.    Consultants  none    Code Status  Full code, discussed on admission.    Review of Systems  Review of Systems   Constitutional: Negative for chills, diaphoresis, fever and malaise/fatigue.   HENT: Negative for congestion and sore throat.    Eyes: Negative for pain and discharge.   Respiratory: Negative for cough, hemoptysis, sputum production, shortness of breath and wheezing.    Cardiovascular: Negative for chest pain, palpitations, claudication and leg swelling.   Gastrointestinal: Negative for abdominal pain, constipation, diarrhea, melena, nausea and vomiting.   Genitourinary: Negative for dysuria, frequency and urgency.   Musculoskeletal: Negative for back pain, joint pain, myalgias and neck pain.   Skin: Negative for itching and  rash.   Neurological: Positive for sensory change (b/l distal finger tip and b/l toe tingling/numbness x several months.). Negative for dizziness, speech change, focal weakness, loss of consciousness, weakness and headaches.        Ataxia   Endo/Heme/Allergies: Does not bruise/bleed easily.   Psychiatric/Behavioral: Negative for depression, substance abuse and suicidal ideas.        Past Medical History  Past Medical History:   Diagnosis Date   • Cold    • Dental disorder     full upper and lower dentures   • High cholesterol    • Hypertension    • Pain     back   • Psychiatric problem     bipolar       Surgical History  Past Surgical History:   Procedure Laterality Date   • LUMBAR LAMINECTOMY DISKECTOMY  5/30/2017    Procedure: LUMBAR LAMINECTOMY DISKECTOMY FOR MINIMALLY INVASIVE CORRINE L3-5 LAMINOTOMIES VIA LT SIDED UNI-PORTAL APPROACH;  Surgeon: Ant Ovalles M.D.;  Location: SURGERY [facility];  Service:    vasectomy  Skin cancer removal forehead basal cell ca.    Medications  No current facility-administered medications on file prior to encounter.      Current Outpatient Prescriptions on File Prior to Encounter   Medication Sig Dispense Refill   • hydrocodone-acetaminophen (NORCO) 5-325 MG Tab per tablet Take 1-2 Tabs by mouth every four hours as needed. 100 Tab 0   • tizanidine (ZANAFLEX) 2 MG tablet Take 1 Tab by mouth 3 times a day as needed (As needed for muscle spasm). 90 Tab 3   • amitriptyline (ELAVIL) 25 MG Tab Take 25 mg by mouth every bedtime.     • betamethasone valerate (VALISONE) 0.1 % Cream Apply 1 Application to affected area(s) 2 times a day. Left foot     • busPIRone (BUSPAR) 10 MG Tab Take 10 mg by mouth 2 times a day. Indications: Symptoms of Feeling Anxious     • carbidopa-levodopa SR (SINEMET CR)  MG per tablet Take 1 Tab by mouth every bedtime. Indications: Parkinson's Disease     • carbidopa-levodopa (SINEMET)  MG Tab Take 2 Tabs by mouth 3 times a day, with meals.     •  vitamin D (CHOLECALCIFEROL) 1000 UNIT Tab Take 1,000 Units by mouth every day.     • clonazepam (KLONOPIN) 0.5 MG Tab Take 0.5 mg by mouth every day. Indications: Panic Disorder     • lithium CR (LITHOBID) 300 MG Tab CR Take 300 mg by mouth 2 times a day. Indications: mood stabilizer     • losartan (COZAAR) 25 MG Tab Take 25 mg by mouth every evening. Indications: blood pressure     • omeprazole (PRILOSEC) 20 MG Tablet Delayed Response delayed-release tablet Take 1 Tab by mouth every day.     • pravastatin (PRAVACHOL) 40 MG tablet Take 20 mg by mouth every evening. Indications: high chloesterol     • prazosin (MINIPRESS) 2 MG Cap Take 4 mg by mouth every bedtime.     • risperidone (RISPERDAL) 0.5 MG Tab Take 0.5 mg by mouth every bedtime. Indications: Schizophrenia     • urea 10 % lotion Apply 1 Application to affected area(s) 2 Times a Day.         Family History  Brother  from lymphoma, sister  from lung cancer    Social History  Social History   Substance Use Topics   • Smoking status: Current Every Day Smoker     Types: Pipe   • Smokeless tobacco: Never Used   • Alcohol use Yes      Comment: 2 drink per day   smokes 5 pipes daily, lives alone, son is POA, lives in [place].  Patient still drives.  Drove himself to the [facility].  Has cane and walker at home, but does not need all the time.      Allergies  No Known Allergies     Physical Exam  Laboratory   Hemodynamics  Temp (24hrs), Av.2 °C (97.1 °F), Min:36.2 °C (97.1 °F), Max:36.2 °C (97.1 °F)   Temperature: 36.2 °C (97.1 °F)  Pulse  Av.7  Min: 59  Max: 69    Blood Pressure : 144/62      Respiratory      Respiration: 16, Pulse Oximetry: 97 %     Work Of Breathing / Effort: Mild  RUL Breath Sounds: Clear, RML Breath Sounds: Diminished, RLL Breath Sounds: Diminished, GT Breath Sounds: Clear, LLL Breath Sounds: Diminished    Physical Exam   Constitutional: He is oriented to person, place, and time. He appears well-developed and well-nourished.  No distress.   HENT:   Head: Normocephalic and atraumatic.   Mouth/Throat: Oropharynx is clear and moist. No oropharyngeal exudate.   Eyes: Conjunctivae and EOM are normal. Pupils are equal, round, and reactive to light. Right eye exhibits no discharge. Left eye exhibits no discharge. No scleral icterus.   Neck: Normal range of motion. Neck supple. No JVD present. No tracheal deviation present. No thyromegaly present.   Cardiovascular: Normal rate, regular rhythm and normal heart sounds.  Exam reveals no gallop and no friction rub.    No murmur heard.  Pulmonary/Chest: Effort normal and breath sounds normal. No respiratory distress. He has no wheezes. He has no rales. He exhibits no tenderness.   Abdominal: Soft. Bowel sounds are normal. He exhibits no distension and no mass. There is no tenderness. There is no rebound and no guarding.   Musculoskeletal: Normal range of motion. He exhibits no edema or tenderness.   Lymphadenopathy:     He has no cervical adenopathy.   Neurological: He is alert and oriented to person, place, and time. No cranial nerve deficit. He exhibits normal muscle tone.   Gait observed, no fall at bedside, finger to nose testing intact both hands.   Skin: Skin is warm and dry. No rash noted. He is not diaphoretic. No erythema.   Psychiatric: He has a normal mood and affect. His behavior is normal. Judgment and thought content normal.   S/p haldol injection, calm, agrees to await MRI results head and neck.   Nursing note and vitals reviewed.              No results for input(s): ALTSGPT, ASTSGOT, ALKPHOSPHAT, TBILIRUBIN, DBILIRUBIN, GAMMAGT, AMYLASE, LIPASE, ALB, PREALBUMIN, GLUCOSE in the last 72 hours.              No results found for: TROPONINI  Urinalysis:    Lab Results  Component Value Date/Time   SPECGRAVITY 1.014 05/30/2017 0830   GLUCOSEUR Negative 05/30/2017 0830   KETONES Trace (A) 05/30/2017 0830   NITRITE Negative 05/30/2017 0830        Imaging  CTA head and neck negative, no  stenosis seen.  CT head w/o negative.   Assessment/Plan     I anticipate this patient is appropriate for observation status at this time.    * Ataxia- (present on admission)   Assessment & Plan    CT head negative, CTA head and neck negative.  Ordered MRI brain w/o , C and T spine due to b/l finger numbness and lower leg numbness.  PT/OT evals.  CXR negative  UA negative  UDS +barbiturates, opiates.  Tylenol negative  Salicylates negative.  EKG wnl rate 55, QTc 421.        Parkinson disease (CMS-HCC)- (present on admission)   Assessment & Plan    Continue home sinemet.  Unclear if sudden onset ataxia related.        Numbness and tingling in both hands- (present on admission)   Assessment & Plan    Has been ongoing x several months  MR including C and T spine  No h/o diabetes.        Smoker- (present on admission)   Assessment & Plan    Nicotine replacement and cessation counseling given        Bipolar disorder (CMS-HCC)- (present on admission)   Assessment & Plan    Lithium level wnl 0.8  Continue home medication               VTE prophylaxis: lovenox.

## 2017-11-15 NOTE — ED PROVIDER NOTES
ED Provider Note    Chief Complaint:   Ataxia    HPI:  Antoni Shirley is a 77 y.o. male who presents to the emergency department as a transfer from [facility] out of concern for CVA. He lives home alone, drove himself to the emergency department because he was having difficulty walking. At that time he stated that he suspects he had difficulty walking for about 3-4 hours, but does not know the specific time. There is no one with him who is able to verify a last normal time. By his timeline, his abnormal gait began at approximately 7:15 PM last night.    On discussion with the referring physician, that physician states that he walked into the emergency department and immediately walked into a wall. This is consistent with a gait that the patient describes. Transferring physician reported an abnormal Romberg test. CT head was performed at outlying facility, this was negative. Patient was transferred to our facility out of concern for CVA as the other facility did not have neurology. Her transferring physician, this patient was outside of the window for stroke activation or alteplase. On arrival to our facility he is not a candidate for stroke activation as we do not have a last normal time that can be verified.    He denies any headaches today, states he did have a mild headache 4 days ago that resolved. Denies any chest pain, denies shortness of breath. No nausea, no vomiting. Denies fevers. States he has had some tingling in the tips of his fingers and tips in his toes for several weeks, this is unchanged today. Denies any unilateral weakness. He does have a history of tobacco use, is on medications typically used for Parkinson's or other dementia but denies history of dementia. Denies any use of antiplatelet or anticoagulation agents.    Review of Systems:  See HPI for pertinent positives and negatives. All other systems negative.    Past Medical History:   has a past medical history of Cold; Dental disorder; High  "cholesterol; Hypertension; Pain; and Psychiatric problem.    Social History:  Social History     Social History Main Topics   • Smoking status: Current Every Day Smoker     Types: Pipe   • Smokeless tobacco: Never Used   • Alcohol use Yes      Comment: 2 drink per day   • Drug use: No   • Sexual activity: Not on file       Surgical History:   has a past surgical history that includes lumbar laminectomy diskectomy (2017).    Current Medications:  Home Medications    **Home medications have not yet been reviewed for this encounter**         Allergies:  No Known Allergies    Physical Exam:  Vital Signs: Ht 1.702 m (5' 7\")   Wt 68.9 kg (152 lb)   BMI 23.81 kg/m²   Constitutional: Alert, no acute distress, fingers are dirty from pipe tobacco  HENT: Normocephalic, atraumatic, moist mucus membranes  Eyes: Pupils equal and reactive, normal conjunctiva, non-icteric  Neck: Supple, normal range of motion, no stridor  Cardiovascular: Normal peripheral perfusion, no murmer, no cyanosis, normal cardiac auscultation  Pulmonary: No respiratory distress, normal work of breathing, no accessory muscule usage, breath sounds clear and equal bilaterally  Abdomen: Bowel sounds present, non-tender, non-distended with no peritoneal signs, no palpable masses  Skin: Warm, dry, multiple bruises on upper extremities  Back: No pain with active range of motion  Musculoskeletal: Normal range of motion in all extremities, no swelling or deformity noted  Neurologic: CN II-XII intact, speech normal, muscle strength 5/5 in all four extremities, normal  strength bilaterally, sensation grossly intact, moderate tremor on finger to nose testing, normal heel-to-shin testing, no pronator drift, able to ambulate with one misstep having to steady himself on a wall  Psychiatric: Labile mood, becomes very agitated because he is not allowed to smoke in the hospital, then columns, becomes tearful when talking about his  wife    EKG:  Rate 55, " sinus bradycardia, less than 1 mm elevation isolated to V2, P waves present, irregular rhythm    Labs:  Labs Reviewed   ESTIMATED GFR   LITHIUM       Radiology:  CT-HEAD W/O   Preliminary Result         1.  No acute intracranial abnormality.   2.  Left maxillary sinusitis changes.      CT-CTA NECK WITH & W/O-POST PROCESSING   Final Result         1.  Mild bilateral carotid bifurcation atherosclerosis without significant stenosis.   2.  Multiple bilateral thyroid lesions, follow-up thyroid sonography for further characterization.   3.  Left maxillary sinusitis changes      CT-CTA HEAD WITH & W/O-POST PROCESS   Final Result         1.  CT angiogram of the [place] within normal limits.      OUTSIDE IMAGES-CT HEAD   Final Result      OUTSIDE IMAGES-DX CHEST   Final Result           Medical records reviewed for continuity of care. Medical records reviewed from [place]. CT head performed at that time is negative for acute process. No significant laboratory abnormalities noted.    Differential diagnosis:  CVA, TIA, cerebellar infarction, electrolyte abnormality, spinal cord lesion    MDM:  History and physical exam as documented above. This patient was reported to have severe ataxia on arrival to the [facility], now is able to ambulate with a fairly steady gait. He does appear to be alert and oriented, desperately wishes to smoke, is able to describe his gait abnormalities are no longer present.    CTA of the head is negative for acute process. CTA neck demonstrates mild bilateral carotid bifurcation atherosclerosis without significant stenosis. Multiple bilateral thyroid lesions noted.    On laboratory evaluation his white blood count is 5.9, hemoglobin 13.4, no evidence of infectious etiology, no evidence of symptomatic anemia. CMP at [facility] demonstrates a blood glucose of 91, creatinine 0.7, sodium 143, potassium 4.1, CO2 24, lipase 26, AST 34, ALT 21, alk phos 85. Troponin is undetectable.  Urine drug screen is positive for barbiturates as well as opiates. Blood alcohol level is negative. Urinalysis negative for evidence of infection, positive for ketones.     We contacted the patient's next of kin, his son, as the patient does appear to want to leave so that he can smoke. Additionally we gave him a nicotine patch to convince him to stay. Her son believes he is medical power of , however does not have the documentation. The son would like the patient hospitalized for his safety. Additionally son states that the patient has bipolar and schizophrenia, currently taking lithium, Haldol and Risperdal. Ativan and Haldol given IV to the patient to help calm him.    Plan at this time is for admission for evaluation for TIA given the patient's ataxia that now seems to have resolved. I discussed the case with Dr. Ballard, [facility] hospitalist, who kindly agrees to admit the patient. Lithium level ordered, discussed with admitting hospitalist, this will be followed up on an inpatient basis.            Disposition:  Admit to hospitalist in guarded condition    Final Impression:  1. Ataxia    2. Bipolar 1 disorder (CMS-[facility])    3. Schizophrenia, unspecified type (CMS-[facility])        Electronically signed by: Kerri Wnin, 11/15/2017 4:13 AM

## 2017-11-15 NOTE — PROGRESS NOTES
Patient admitted to -[facility]-1 . Tele box on, patient assessed. Oriented patient to room, skylight, and how to use call light. Call light within reach, bed alarm on, treaded socks on.

## 2017-11-15 NOTE — ED NOTES
Pt Medicated per Mar. Medication administered with physician witness.  Pt demanded to be able to stand up. 2 RN assist with ERP at bedside.Pt stood with steady gate. To reduce anxiety pt allowed. To walk around, RN at side to assist. Pt ambulated appx 1000ft with steady gate and no assistance. Pt continued to be upset and wanting to smoke. Pt Was administered  A  nicotine patch, and was medicated with 1mg of Haldol IV. Pt then ambulated to room. Pt became much calmer, and is now sleeping comfortably in bed, with no s/s of distress. Breathing is easy and unlabored.

## 2017-11-15 NOTE — THERAPY
"Physical Therapy Evaluation completed.   Bed Mobility:  Supine to Sit: Stand by Assist  Transfers: Sit to Stand: Minimal Assist  Gait: Level Of Assist: Moderate Assist with No Equipment Needed       Plan of Care: Will benefit from Physical Therapy 3 times per week  Discharge Recommendations: Equipment: Will Continue to Assess for Equipment Needs. Post-acute therapy Discharge to a transitional care facility for continued skilled therapy services or Discharge to home with outpatient or home health for additional skilled therapy services.    Pt presents with decreased functional mobiltiy most likely due to impaired B LE coordination. During gait, pt with occasional scissor pattern and had several LOB laterally requiring PT mod A for correction. He will benefit from further acute skilled PT services to improve functional mobility and may require placement pending his progression and medical POC. At current level of function, would not be appropriate to return home alone.    See \"Rehab Therapy-Acute\" Patient Summary Report for complete documentation.     "

## 2017-11-15 NOTE — PROGRESS NOTES
Alaina Eason Fall Risk Assessment:     Last Known Fall: No falls  Mobility: Dizziness/generalized weakness  Medications: Cardiovascular or central nervous system meds  Mental Status/LOC/Awareness: Awake, alert, and oriented to date, place, and person  Toileting Needs: Use of assistive device (Bedside commode, bedpan, urinal)  Volume/Electrolyte Status: No problems  Communication/Sensory: Visual (Glasses)/hearing deficit  Behavior: Appropriate behavior  Alaina Eason Fall Risk Total: 8  Fall Risk Level: LOW RISK    Universal Fall Precautions:  call light/belongings in reach, bed in low position and locked, wheelchairs and assistive devices out of sight, siderails up x 2, use non-slip footwear, adequate lighting, clutter free and spill free environment, educate on level of risk, educate to call for assistance    Fall Risk Level Interventions:   TRIAL (TELE 8, NEURO, MED MADELINE 5) Low Fall Risk Interventions  Place yellow fall risk ID band on patient: completed  Provide patient/family education based on risk assessment: completed  Educate patient/family to call staff for assistance when getting out of bed: completed  Place fall precaution signage outside patient door: completed      Patient Specific Interventions:     Medication: limit combination of prn medications  Mental Status/LOC/Awareness: check on patient hourly, utilize bed/chair fall alarm and reinforce the use of call light  Toileting: provide frquent toileting  Volume/Electrolyte Status: ensure patient remains hydrated and monitor abnormal lab values  Communication/Sensory: update plan of care on whiteboard and have patients with hearing deficit repeat information back to you to ensure proper understanding  Behavioral: engage patient in daily activities  Mobility: dangle prior to standing and ensure bed is locked and in lowest position

## 2017-11-15 NOTE — ASSESSMENT & PLAN NOTE
- Stroke workup including MRI of the brain negative  - Physical therapy and occupational therapy following, placement versus home health  - concerning that this is a worsening of his parkinson's   - Continue aspirin and statin  - pt lives alone, not safe to d/c home, may need placement but does seem to be improving

## 2017-11-15 NOTE — DISCHARGE PLANNING
Medical Social Work    Referral: Legal Hold    Intervention: SW notified by unit SW of legal hold. Copy obtained, but will need certification signed.     Plan: Legal Hold SW to f/u tomorrow regarding certification of legal hold.

## 2017-11-15 NOTE — ED NOTES
Pt resting comfortably in bed, no s/s of distress noted, Breathing is easy and unlabored. Call bell within reach, will continue to monitor.

## 2017-11-16 ENCOUNTER — APPOINTMENT (OUTPATIENT)
Dept: RADIOLOGY | Facility: MEDICAL CENTER | Age: 77
End: 2017-11-16
Attending: HOSPITALIST
Payer: COMMERCIAL

## 2017-11-16 PROBLEM — D69.6 THROMBOCYTOPENIA (HCC): Status: ACTIVE | Noted: 2017-11-16

## 2017-11-16 PROBLEM — D53.9 MACROCYTIC ANEMIA: Status: ACTIVE | Noted: 2017-11-16

## 2017-11-16 LAB
ANION GAP SERPL CALC-SCNC: 4 MMOL/L (ref 0–11.9)
BASOPHILS # BLD AUTO: 0.3 % (ref 0–1.8)
BASOPHILS # BLD: 0.02 K/UL (ref 0–0.12)
BUN SERPL-MCNC: 12 MG/DL (ref 8–22)
CALCIUM SERPL-MCNC: 8.8 MG/DL (ref 8.5–10.5)
CHLORIDE SERPL-SCNC: 110 MMOL/L (ref 96–112)
CHOLEST SERPL-MCNC: 128 MG/DL (ref 100–199)
CO2 SERPL-SCNC: 25 MMOL/L (ref 20–33)
CREAT SERPL-MCNC: 0.69 MG/DL (ref 0.5–1.4)
EOSINOPHIL # BLD AUTO: 0.36 K/UL (ref 0–0.51)
EOSINOPHIL NFR BLD: 5.3 % (ref 0–6.9)
ERYTHROCYTE [DISTWIDTH] IN BLOOD BY AUTOMATED COUNT: 49.2 FL (ref 35.9–50)
FOLATE SERPL-MCNC: 11.5 NG/ML
GFR SERPL CREATININE-BSD FRML MDRD: >60 ML/MIN/1.73 M 2
GLUCOSE SERPL-MCNC: 94 MG/DL (ref 65–99)
HCT VFR BLD AUTO: 37.8 % (ref 42–52)
HDLC SERPL-MCNC: 57 MG/DL
HGB BLD-MCNC: 12.4 G/DL (ref 14–18)
IMM GRANULOCYTES # BLD AUTO: 0.01 K/UL (ref 0–0.11)
IMM GRANULOCYTES NFR BLD AUTO: 0.1 % (ref 0–0.9)
LDLC SERPL CALC-MCNC: 56 MG/DL
LYMPHOCYTES # BLD AUTO: 3.07 K/UL (ref 1–4.8)
LYMPHOCYTES NFR BLD: 45.3 % (ref 22–41)
MCH RBC QN AUTO: 32.8 PG (ref 27–33)
MCHC RBC AUTO-ENTMCNC: 32.8 G/DL (ref 33.7–35.3)
MCV RBC AUTO: 100 FL (ref 81.4–97.8)
MONOCYTES # BLD AUTO: 0.69 K/UL (ref 0–0.85)
MONOCYTES NFR BLD AUTO: 10.2 % (ref 0–13.4)
NEUTROPHILS # BLD AUTO: 2.63 K/UL (ref 1.82–7.42)
NEUTROPHILS NFR BLD: 38.8 % (ref 44–72)
NRBC # BLD AUTO: 0 K/UL
NRBC BLD AUTO-RTO: 0 /100 WBC
PLATELET # BLD AUTO: 144 K/UL (ref 164–446)
PMV BLD AUTO: 11.5 FL (ref 9–12.9)
POTASSIUM SERPL-SCNC: 3.8 MMOL/L (ref 3.6–5.5)
RBC # BLD AUTO: 3.78 M/UL (ref 4.7–6.1)
SODIUM SERPL-SCNC: 139 MMOL/L (ref 135–145)
TRIGL SERPL-MCNC: 77 MG/DL (ref 0–149)
VIT B12 SERPL-MCNC: 447 PG/ML (ref 211–911)
WBC # BLD AUTO: 6.8 K/UL (ref 4.8–10.8)

## 2017-11-16 PROCEDURE — 99225 PR SUBSEQUENT OBSERVATION CARE,LEVEL II: CPT | Performed by: INTERNAL MEDICINE

## 2017-11-16 PROCEDURE — 72146 MRI CHEST SPINE W/O DYE: CPT

## 2017-11-16 PROCEDURE — 85025 COMPLETE CBC W/AUTO DIFF WBC: CPT

## 2017-11-16 PROCEDURE — 70551 MRI BRAIN STEM W/O DYE: CPT

## 2017-11-16 PROCEDURE — 80061 LIPID PANEL: CPT

## 2017-11-16 PROCEDURE — 700102 HCHG RX REV CODE 250 W/ 637 OVERRIDE(OP): Performed by: HOSPITALIST

## 2017-11-16 PROCEDURE — G0378 HOSPITAL OBSERVATION PER HR: HCPCS

## 2017-11-16 PROCEDURE — 36415 COLL VENOUS BLD VENIPUNCTURE: CPT

## 2017-11-16 PROCEDURE — 72141 MRI NECK SPINE W/O DYE: CPT

## 2017-11-16 PROCEDURE — 80048 BASIC METABOLIC PNL TOTAL CA: CPT

## 2017-11-16 PROCEDURE — 82607 VITAMIN B-12: CPT

## 2017-11-16 PROCEDURE — 82746 ASSAY OF FOLIC ACID SERUM: CPT

## 2017-11-16 PROCEDURE — A9270 NON-COVERED ITEM OR SERVICE: HCPCS | Performed by: HOSPITALIST

## 2017-11-16 PROCEDURE — 700111 HCHG RX REV CODE 636 W/ 250 OVERRIDE (IP): Performed by: HOSPITALIST

## 2017-11-16 RX ADMIN — CARBIDOPA AND LEVODOPA 2 TABLET: 25; 100 TABLET ORAL at 09:34

## 2017-11-16 RX ADMIN — BUSPIRONE HYDROCHLORIDE 10 MG: 10 TABLET ORAL at 00:19

## 2017-11-16 RX ADMIN — VITAMIN D, TAB 1000IU (100/BT) 1000 UNITS: 25 TAB at 09:34

## 2017-11-16 RX ADMIN — RISPERIDONE 0.5 MG: 0.5 TABLET, FILM COATED ORAL at 21:52

## 2017-11-16 RX ADMIN — BUSPIRONE HYDROCHLORIDE 10 MG: 10 TABLET ORAL at 21:52

## 2017-11-16 RX ADMIN — PRAVASTATIN SODIUM 20 MG: 20 TABLET ORAL at 21:52

## 2017-11-16 RX ADMIN — LITHIUM CARBONATE 300 MG: 300 TABLET, EXTENDED RELEASE ORAL at 00:20

## 2017-11-16 RX ADMIN — PRAZOSIN HYDROCHLORIDE 4 MG: 2 CAPSULE ORAL at 00:22

## 2017-11-16 RX ADMIN — ASPIRIN 81 MG: 81 TABLET, COATED ORAL at 09:34

## 2017-11-16 RX ADMIN — CARBIDOPA AND LEVODOPA 1 TABLET: 50; 200 TABLET, EXTENDED RELEASE ORAL at 01:22

## 2017-11-16 RX ADMIN — CARBIDOPA AND LEVODOPA 2 TABLET: 25; 100 TABLET ORAL at 12:42

## 2017-11-16 RX ADMIN — ENOXAPARIN SODIUM 40 MG: 100 INJECTION SUBCUTANEOUS at 09:34

## 2017-11-16 RX ADMIN — LITHIUM CARBONATE 300 MG: 300 TABLET, EXTENDED RELEASE ORAL at 11:46

## 2017-11-16 RX ADMIN — CLONAZEPAM 0.5 MG: 0.5 TABLET ORAL at 09:34

## 2017-11-16 RX ADMIN — OMEPRAZOLE 20 MG: 20 CAPSULE, DELAYED RELEASE ORAL at 09:34

## 2017-11-16 RX ADMIN — CARBIDOPA AND LEVODOPA 2 TABLET: 25; 100 TABLET ORAL at 17:52

## 2017-11-16 RX ADMIN — LOSARTAN POTASSIUM 50 MG: 50 TABLET, FILM COATED ORAL at 00:16

## 2017-11-16 RX ADMIN — PRAZOSIN HYDROCHLORIDE 4 MG: 2 CAPSULE ORAL at 21:53

## 2017-11-16 RX ADMIN — AMITRIPTYLINE HYDROCHLORIDE 25 MG: 25 TABLET, FILM COATED ORAL at 21:53

## 2017-11-16 RX ADMIN — PRAVASTATIN SODIUM 20 MG: 20 TABLET ORAL at 01:24

## 2017-11-16 RX ADMIN — BUSPIRONE HYDROCHLORIDE 10 MG: 10 TABLET ORAL at 09:52

## 2017-11-16 RX ADMIN — RISPERIDONE 0.5 MG: 0.5 TABLET, FILM COATED ORAL at 00:18

## 2017-11-16 RX ADMIN — STANDARDIZED SENNA CONCENTRATE AND DOCUSATE SODIUM 2 TABLET: 8.6; 5 TABLET, FILM COATED ORAL at 21:51

## 2017-11-16 RX ADMIN — STANDARDIZED SENNA CONCENTRATE AND DOCUSATE SODIUM 2 TABLET: 8.6; 5 TABLET, FILM COATED ORAL at 09:34

## 2017-11-16 RX ADMIN — STANDARDIZED SENNA CONCENTRATE AND DOCUSATE SODIUM 2 TABLET: 8.6; 5 TABLET, FILM COATED ORAL at 00:19

## 2017-11-16 RX ADMIN — AMITRIPTYLINE HYDROCHLORIDE 25 MG: 25 TABLET, FILM COATED ORAL at 00:19

## 2017-11-16 RX ADMIN — CARBIDOPA AND LEVODOPA 1 TABLET: 50; 200 TABLET, EXTENDED RELEASE ORAL at 22:58

## 2017-11-16 RX ADMIN — NICOTINE 14 MG: 14 PATCH, EXTENDED RELEASE TRANSDERMAL at 05:02

## 2017-11-16 RX ADMIN — LITHIUM CARBONATE 300 MG: 300 TABLET, EXTENDED RELEASE ORAL at 21:53

## 2017-11-16 ASSESSMENT — ENCOUNTER SYMPTOMS
VOMITING: 0
CONSTIPATION: 0
NAUSEA: 0
FEVER: 0
CHILLS: 0
MYALGIAS: 0
FALLS: 0
SHORTNESS OF BREATH: 0
HEADACHES: 0
LOSS OF CONSCIOUSNESS: 0
WEAKNESS: 1
TINGLING: 0
PALPITATIONS: 0
DEPRESSION: 0
ABDOMINAL PAIN: 0
STRIDOR: 0
DIZZINESS: 0
SPUTUM PRODUCTION: 0
DIARRHEA: 0
COUGH: 0

## 2017-11-16 ASSESSMENT — PATIENT HEALTH QUESTIONNAIRE - PHQ9
SUM OF ALL RESPONSES TO PHQ9 QUESTIONS 1 AND 2: 0
SUM OF ALL RESPONSES TO PHQ9 QUESTIONS 1 AND 2: 0
SUM OF ALL RESPONSES TO PHQ QUESTIONS 1-9: 0
SUM OF ALL RESPONSES TO PHQ QUESTIONS 1-9: 0
2. FEELING DOWN, DEPRESSED, IRRITABLE, OR HOPELESS: NOT AT ALL
1. LITTLE INTEREST OR PLEASURE IN DOING THINGS: NOT AT ALL
2. FEELING DOWN, DEPRESSED, IRRITABLE, OR HOPELESS: NOT AT ALL
1. LITTLE INTEREST OR PLEASURE IN DOING THINGS: NOT AT ALL

## 2017-11-16 ASSESSMENT — PAIN SCALES - GENERAL
PAINLEVEL_OUTOF10: 0
PAINLEVEL_OUTOF10: 0

## 2017-11-16 ASSESSMENT — COPD QUESTIONNAIRES
DURING THE PAST 4 WEEKS HOW MUCH DID YOU FEEL SHORT OF BREATH: NONE/LITTLE OF THE TIME
COPD SCREENING SCORE: 0
DO YOU EVER COUGH UP ANY MUCUS OR PHLEGM?: NO/ONLY WITH OCCASIONAL COLDS OR INFECTIONS
HAVE YOU SMOKED AT LEAST 100 CIGARETTES IN YOUR ENTIRE LIFE: NO/DON'T KNOW

## 2017-11-16 NOTE — PROGRESS NOTES
[facility] Hospitalist Progress Note    Date of Service: 2017    Chief Complaint  77 y.o. male admitted 11/15/2017 with weakness.    Interval Problem Update  Patient initially presented to outside facility, transferred here for high [facility] care.  Patient was shouting papers at his house, noted difficulty standing and then difficulty ambulating.  Patient also states over the past couple of months he's noted with ambulation if he goes through a doorway it's not uncommon that he hits the left or the right side however hitting the left side is more often.  Patient was taking aspirin, stopped taking this a couple months ago due to increased bruising.  Discussed plan patient condition with nurse at bedside.    Consultants/Specialty  None    Disposition  Patient requires additional treatment in the hospital, awaiting physical and occupational therapy to determine where he would need to go upon discharge        Review of Systems   Constitutional: Negative for chills and fever.   HENT: Negative for congestion.    Respiratory: Negative for cough, sputum production, shortness of breath and stridor.    Cardiovascular: Negative for chest pain, palpitations and leg swelling.   Gastrointestinal: Negative for abdominal pain, constipation, diarrhea, nausea and vomiting.   Genitourinary: Negative for dysuria and urgency.   Musculoskeletal: Negative for falls and myalgias.   Neurological: Positive for weakness. Negative for dizziness, tingling, loss of consciousness and headaches.   Psychiatric/Behavioral: Negative for depression and suicidal ideas.   All other systems reviewed and are negative.     Physical Exam  Laboratory/Imaging   Hemodynamics  Temp (24hrs), Av.8 °C (98.2 °F), Min:36.3 °C (97.4 °F), Max:37.6 °C (99.6 °F)   Temperature: 36.3 °C (97.4 °F)  Pulse  Av.7  Min: 51  Max: 69    Blood Pressure : 122/51      Respiratory      Respiration: 18, Pulse Oximetry: 91 %        RUL Breath Sounds: Expiratory Wheezes, RML  Breath Sounds: Expiratory Wheezes, RLL Breath Sounds: Expiratory Wheezes, GT Breath Sounds: Expiratory Wheezes, LLL Breath Sounds: Expiratory Wheezes    Fluids    Intake/Output Summary (Last 24 hours) at 11/16/17 0805  Last data filed at 11/16/17 0500   Gross per 24 hour   Intake              125 ml   Output              360 ml   Net             -235 ml       Nutrition  Orders Placed This Encounter   Procedures   • Diet Order     Standing Status:   Standing     Number of Occurrences:   1     Order Specific Question:   Diet:     Answer:   Regular [1]     Physical Exam   Constitutional:  Non-toxic appearance. No distress.   Thin and frail appearing    HENT:   Head: Normocephalic and atraumatic.   Mouth/Throat: Oropharynx is clear and moist. No oropharyngeal exudate.   Eyes: Right eye exhibits no discharge. Left eye exhibits no discharge.   Neck: Neck supple. No tracheal deviation, no edema and no erythema present.   Cardiovascular: Normal rate and regular rhythm.  Exam reveals no gallop and no friction rub.    No murmur heard.  Pulmonary/Chest: Effort normal and breath sounds normal. No stridor. No respiratory distress. He has no wheezes. He has no rales. He exhibits no tenderness.   Abdominal: Soft. Bowel sounds are normal. He exhibits no distension. There is no tenderness.   Musculoskeletal: Normal range of motion. He exhibits no edema or tenderness.   Lymphadenopathy:     He has no cervical adenopathy.   Neurological: He is alert.   Some confusion    Skin: Skin is warm and dry. No rash noted. He is not diaphoretic. No erythema.   Multiple wounds in different stages of healing   Psychiatric: He has a normal mood and affect. Judgment and thought content normal. He is slowed. Cognition and memory are impaired.   Nursing note and vitals reviewed.      Recent Labs      11/16/17   0252   WBC  6.8   RBC  3.78*   HEMOGLOBIN  12.4*   HEMATOCRIT  37.8*   MCV  100.0*   MCH  32.8   MCHC  32.8*   RDW  49.2   PLATELETCT  144*    MPV  11.5     Recent Labs      11/16/17   0252   SODIUM  139   POTASSIUM  3.8   CHLORIDE  110   CO2  25   GLUCOSE  94   BUN  12   CREATININE  0.69   CALCIUM  8.8             Recent Labs      11/16/17   0252   TRIGLYCERIDE  77   HDL  57   LDL  56          Assessment/Plan     * Ataxia- (present on admission)   Assessment & Plan    - Stroke workup including MRI of the brain pending  - Physical therapy and occupational therapy to see the patient, await recommendations  - Patient was initially started on Cozaar, will DC this in case this is an acute stroke and the patient needs permissive hypertension  - Continue aspirin and statin          Thrombocytopenia (CMS-HCC)   Assessment & Plan    - Mild, patient is on Lovenox will repeat a CBC in the morning to make sure it doesn't drop significantly        Macrocytic anemia   Assessment & Plan    - No sign of gross bleeding  - No need for transfusion        Parkinson disease (CMS-HCC)- (present on admission)   Assessment & Plan    - Continue Sinemet and await therapies        Numbness and tingling in both hands- (present on admission)   Assessment & Plan    - MRI of the spine also pending  - This is been ongoing for months        Bipolar disorder (CMS-HCC)- (present on admission)   Assessment & Plan    - Continue lithium              Reviewed items::  Labs reviewed, Medications reviewed and Radiology images reviewed  DVT prophylaxis pharmacological::  Enoxaparin (Lovenox)

## 2017-11-16 NOTE — DISCHARGE PLANNING
PMR order received from Dr. Cronin.  [facility] is shown for his medical provider.  Antoni was indep prior to his unfortunate event.  Dr. Edmond to consult.  I do appreciate the referral.

## 2017-11-16 NOTE — PROGRESS NOTES
11/14/17  2237 tele reporting wandering atrial rhythm with 1.4 second pauses.  2356 tele reporting 1.5 second pause with blocked PACs.   11/15/17  0442 tele reporting bradycardia rates between 38 and 55, but sustaining in low 40's. Checked on patient. Patient asymptomatic.

## 2017-11-16 NOTE — PROGRESS NOTES
AAOX4, SÁNCHEZ denies any pain nor discomfort, up to br sba, POC dicussed, MRI ordered, needs attended.

## 2017-11-16 NOTE — CARE PLAN
Problem: Knowledge Deficit  Goal: Knowledge of disease process/condition, treatment plan, diagnostic tests, and medications will improve    Intervention: Explain information regarding disease process/condition, treatment plan, diagnostic tests, and medications and document in education  MRI      Problem: Pain Management  Goal: Pain level will decrease to patient's comfort goal    Intervention: Follow pain managment plan developed in collaboration with patient and Interdisciplinary Team  Prn meds per MAR

## 2017-11-16 NOTE — CARE PLAN
Problem: Respiratory:  Goal: Respiratory status will improve    Intervention: Educate and encourage coughing and deep breathing  Auscultated expiratory wheeze to anterior lung fields. Coughing and deep breathing performed by patient.

## 2017-11-16 NOTE — PROGRESS NOTES
Pt care assumed. Pt lying comfortably in bed. A&Ox 4. No distress present; no pain. Call light, phone, and bedside table within reach. White board updated and plan of care discussed with patient. Bed alarm set and pt calls for assistance appropriately. No concerns present at this time. Will continue to monitor.

## 2017-11-16 NOTE — PROGRESS NOTES
Two RN skin check perfomed. Healed skin grafts noted to lower back and inferior aspect of left knee. Bilat heels, dry, intact skin. Left elbow bruise noted. Skin otherwise intact.

## 2017-11-16 NOTE — PROGRESS NOTES
Pt transferred to [place] [id number]. Monitor removed from pt and monitor room notified. Belongings sent with pt via transport and hospital bed.

## 2017-11-16 NOTE — CARE PLAN
Problem: Bowel/Gastric:  Goal: Normal bowel function is maintained or improved    Intervention: Educate patient and significant other/support system about diet, fluid intake, medications and activity to promote bowel function  Patient demonstrating good understanding of need to take mild laxative to promote BM

## 2017-11-16 NOTE — CONSULTS
Medical chart review completed.     Patient is a 77 y.o. year-old male with a past medical history significant for Parkinson disease on Sinemet, bipolar disorder/schizophrenia on lithium, tobacco abuse, hyperlipidemia, and chronic back pain admitted to [facility] on 11/15/2017  3:33 AM as a transfer from [facility] for stroke workup. Reportedly, the patient was shredding paper at home when he noticed acute onset of ataxia causing him to fall against the wall. He reportedly drove himself to the emergency room in [place] for further workup. CT scans there were reportedly negative for acute abnormality. While there, he reportedly walked into a wall while trying to ambulate. He is complaining of tingling in the bilateral arms.    In the emergency department, he had an episode of agitation to 2 frustration about not being able to smoke. During this episode, he reportedly walked 1000 feet with a steady gait and no assistance. Admission examination revealed normal gait with intact finger-nose-finger bilaterally.    There is report in the chart, though I do not see the lab results, of urine drug screen at [facility] which is positive for barbiturates and opiates.    MRI of the brain, cervical spine, and thoracic spine were completed on November 16, 2017 without clear etiology for his symptoms.    He was evaluated by physical therapy on November 15 and was found to have decreased functional mobility due to impaired coordination. He was evaluated by occupational therapy on November 15 and reportedly required minimal assistance with lower body dressing and sit to stand transfers      PMH:  Past Medical History:   Diagnosis Date   • Cold    • Dental disorder     full upper and lower dentures   • High cholesterol    • Hypertension    • Pain     back   • Psychiatric problem     bipolar       PSH:  Past Surgical History:   Procedure Laterality Date   • LUMBAR LAMINECTOMY DISKECTOMY  5/30/2017    Procedure:  LUMBAR LAMINECTOMY DISKECTOMY FOR MINIMALLY INVASIVE CORRINE L3-5 LAMINOTOMIES VIA LT SIDED UNI-PORTAL APPROACH;  Surgeon: Ant Ovalles M.D.;  Location: SURGERY [facility];  Service:          MEDICATIONS:  Current Facility-Administered Medications   Medication Dose   • amitriptyline (ELAVIL) tablet 25 mg  25 mg   • busPIRone (BUSPAR) tablet 10 mg  10 mg   • carbidopa-levodopa (SINEMET)  MG tablet 2 Tab  2 Tab   • carbidopa-levodopa SR (SINEMET CR)  MG tablet 1 Tab  1 Tab   • clonazepam (KLONOPIN) tablet 0.5 mg  0.5 mg   • hydrocodone-acetaminophen (NORCO) 5-325 MG per tablet 1-2 Tab  1-2 Tab   • lithium CR (LITHOBID) tablet 300 mg  300 mg   • omeprazole (PRILOSEC) capsule 20 mg  20 mg   • pravastatin (PRAVACHOL) tablet 20 mg  20 mg   • prazosin (MINIPRESS) capsule 4 mg  4 mg   • risperidone (RISPERDAL) tablet 0.5 mg  0.5 mg   • tizanidine (ZANAFLEX) tablet 2 mg  2 mg   • vitamin D (cholecalciferol) tablet 1,000 Units  1,000 Units   • senna-docusate (PERICOLACE or SENOKOT S) 8.6-50 MG per tablet 2 Tab  2 Tab    And   • polyethylene glycol/lytes (MIRALAX) PACKET 1 Packet  1 Packet    And   • magnesium hydroxide (MILK OF MAGNESIA) suspension 30 mL  30 mL    And   • bisacodyl (DULCOLAX) suppository 10 mg  10 mg   • Respiratory Care per Protocol     • ondansetron (ZOFRAN) syringe/vial injection 4 mg  4 mg   • ondansetron (ZOFRAN ODT) dispertab 4 mg  4 mg   • enoxaparin (LOVENOX) inj 40 mg  40 mg   • haloperidol lactate (HALDOL) injection 5 mg  5 mg   • nicotine (NICODERM) 14 MG/24HR 14 mg  14 mg    And   • nicotine polacrilex (NICORETTE) 2 MG piece 2 mg  2 mg   • acetaminophen (TYLENOL) tablet 650 mg  650 mg   • aspirin EC (ECOTRIN) tablet 81 mg  81 mg       ALLERGIES:  Patient has no known allergies.    PSYCHOSOCIAL HISTORY:  Living Site:Mobile home  Living With:  self  Caregiver's availability:  Not Applicable  Number of stairs:  4  Substance use history: Current alcohol and tobacco use. Additionally  urine drug screen positive for barbiturates and opiates per report      The patient is not a candidate for inpatient rehabilitation due to the lack of clear etiology resulting in inability to accurately project a reasonable recovery from symptoms. Additionally, there is conflicting documentation regarding his current functional status, and he may currently be too high level functionally to qualify for inpatient rehabilitation.      Thank you for allowing us to participate in him care. Please call with any questions regarding this recommendation.    Richy Edmond M.D.

## 2017-11-16 NOTE — PROGRESS NOTES
Monitor summary: SB 49-56, ME .16, QRS .10, QT .40 with BPAC, with multiple pauses not exceeding 1.55 seconds per strip from monitor room.

## 2017-11-16 NOTE — DISCHARGE PLANNING
Medical Social Work    Referral: DC Legal Hold    Intervention: Legal hold dc'd 11/16/17 1035 by Dr. Cronin. Legal hold dc'd in flowsheets.

## 2017-11-17 ENCOUNTER — APPOINTMENT (OUTPATIENT)
Dept: RADIOLOGY | Facility: MEDICAL CENTER | Age: 77
End: 2017-11-17
Attending: INTERNAL MEDICINE
Payer: COMMERCIAL

## 2017-11-17 PROBLEM — E43 PROTEIN-CALORIE MALNUTRITION, SEVERE (HCC): Status: ACTIVE | Noted: 2017-11-17

## 2017-11-17 PROBLEM — E07.9 THYROID MASS: Status: ACTIVE | Noted: 2017-11-17

## 2017-11-17 LAB
ERYTHROCYTE [DISTWIDTH] IN BLOOD BY AUTOMATED COUNT: 48.5 FL (ref 35.9–50)
HCT VFR BLD AUTO: 41.9 % (ref 42–52)
HGB BLD-MCNC: 13.5 G/DL (ref 14–18)
LV EJECT FRACT  99904: 55
LV EJECT FRACT MOD 2C 99903: 59.95
LV EJECT FRACT MOD 4C 99902: 54.53
LV EJECT FRACT MOD BP 99901: 59.32
MCH RBC QN AUTO: 32.4 PG (ref 27–33)
MCHC RBC AUTO-ENTMCNC: 32.2 G/DL (ref 33.7–35.3)
MCV RBC AUTO: 100.5 FL (ref 81.4–97.8)
PLATELET # BLD AUTO: 184 K/UL (ref 164–446)
PMV BLD AUTO: 11 FL (ref 9–12.9)
RBC # BLD AUTO: 4.17 M/UL (ref 4.7–6.1)
WBC # BLD AUTO: 6.7 K/UL (ref 4.8–10.8)

## 2017-11-17 PROCEDURE — 700111 HCHG RX REV CODE 636 W/ 250 OVERRIDE (IP): Performed by: HOSPITALIST

## 2017-11-17 PROCEDURE — 36415 COLL VENOUS BLD VENIPUNCTURE: CPT

## 2017-11-17 PROCEDURE — 85027 COMPLETE CBC AUTOMATED: CPT

## 2017-11-17 PROCEDURE — 700102 HCHG RX REV CODE 250 W/ 637 OVERRIDE(OP): Performed by: HOSPITALIST

## 2017-11-17 PROCEDURE — G0378 HOSPITAL OBSERVATION PER HR: HCPCS

## 2017-11-17 PROCEDURE — 93306 TTE W/DOPPLER COMPLETE: CPT | Mod: 26 | Performed by: INTERNAL MEDICINE

## 2017-11-17 PROCEDURE — 99226 PR SUBSEQUENT OBSERVATION CARE,LEVEL III: CPT | Performed by: INTERNAL MEDICINE

## 2017-11-17 PROCEDURE — 97530 THERAPEUTIC ACTIVITIES: CPT

## 2017-11-17 PROCEDURE — 76536 US EXAM OF HEAD AND NECK: CPT

## 2017-11-17 PROCEDURE — 97535 SELF CARE MNGMENT TRAINING: CPT

## 2017-11-17 PROCEDURE — G8988 SELF CARE GOAL STATUS: HCPCS | Mod: CI

## 2017-11-17 PROCEDURE — A9270 NON-COVERED ITEM OR SERVICE: HCPCS | Performed by: HOSPITALIST

## 2017-11-17 PROCEDURE — 93306 TTE W/DOPPLER COMPLETE: CPT

## 2017-11-17 PROCEDURE — G8987 SELF CARE CURRENT STATUS: HCPCS | Mod: CJ

## 2017-11-17 RX ADMIN — CARBIDOPA AND LEVODOPA 2 TABLET: 25; 100 TABLET ORAL at 09:06

## 2017-11-17 RX ADMIN — CARBIDOPA AND LEVODOPA 1 TABLET: 50; 200 TABLET, EXTENDED RELEASE ORAL at 20:05

## 2017-11-17 RX ADMIN — LITHIUM CARBONATE 300 MG: 300 TABLET, EXTENDED RELEASE ORAL at 09:06

## 2017-11-17 RX ADMIN — PRAVASTATIN SODIUM 20 MG: 20 TABLET ORAL at 20:05

## 2017-11-17 RX ADMIN — BUSPIRONE HYDROCHLORIDE 10 MG: 10 TABLET ORAL at 09:06

## 2017-11-17 RX ADMIN — LITHIUM CARBONATE 300 MG: 300 TABLET, EXTENDED RELEASE ORAL at 20:05

## 2017-11-17 RX ADMIN — PRAZOSIN HYDROCHLORIDE 4 MG: 2 CAPSULE ORAL at 20:06

## 2017-11-17 RX ADMIN — ASPIRIN 81 MG: 81 TABLET, COATED ORAL at 09:06

## 2017-11-17 RX ADMIN — AMITRIPTYLINE HYDROCHLORIDE 25 MG: 25 TABLET, FILM COATED ORAL at 20:05

## 2017-11-17 RX ADMIN — ENOXAPARIN SODIUM 40 MG: 100 INJECTION SUBCUTANEOUS at 09:06

## 2017-11-17 RX ADMIN — OMEPRAZOLE 20 MG: 20 CAPSULE, DELAYED RELEASE ORAL at 09:06

## 2017-11-17 RX ADMIN — VITAMIN D, TAB 1000IU (100/BT) 1000 UNITS: 25 TAB at 09:06

## 2017-11-17 RX ADMIN — CLONAZEPAM 0.5 MG: 0.5 TABLET ORAL at 09:06

## 2017-11-17 RX ADMIN — BUSPIRONE HYDROCHLORIDE 10 MG: 10 TABLET ORAL at 20:05

## 2017-11-17 RX ADMIN — CARBIDOPA AND LEVODOPA 2 TABLET: 25; 100 TABLET ORAL at 16:45

## 2017-11-17 RX ADMIN — CARBIDOPA AND LEVODOPA 2 TABLET: 25; 100 TABLET ORAL at 11:46

## 2017-11-17 RX ADMIN — NICOTINE 14 MG: 14 PATCH, EXTENDED RELEASE TRANSDERMAL at 05:44

## 2017-11-17 RX ADMIN — RISPERIDONE 0.5 MG: 0.5 TABLET, FILM COATED ORAL at 20:05

## 2017-11-17 ASSESSMENT — COGNITIVE AND FUNCTIONAL STATUS - GENERAL
CLIMB 3 TO 5 STEPS WITH RAILING: A LITTLE
SUGGESTED CMS G CODE MODIFIER MOBILITY: CJ
STANDING UP FROM CHAIR USING ARMS: A LITTLE
DRESSING REGULAR LOWER BODY CLOTHING: A LITTLE
HELP NEEDED FOR BATHING: A LITTLE
DAILY ACTIVITIY SCORE: 22
SUGGESTED CMS G CODE MODIFIER DAILY ACTIVITY: CJ
WALKING IN HOSPITAL ROOM: A LITTLE
MOBILITY SCORE: 21

## 2017-11-17 ASSESSMENT — ENCOUNTER SYMPTOMS
ABDOMINAL PAIN: 0
PALPITATIONS: 0
DEPRESSION: 0
CHILLS: 0
NAUSEA: 0
STRIDOR: 0
SHORTNESS OF BREATH: 0
HEADACHES: 0
NECK PAIN: 0
SPUTUM PRODUCTION: 0
FALLS: 0
LOSS OF CONSCIOUSNESS: 0
MYALGIAS: 0
VOMITING: 0
DIZZINESS: 0
WEAKNESS: 1

## 2017-11-17 ASSESSMENT — PAIN SCALES - GENERAL
PAINLEVEL_OUTOF10: 0

## 2017-11-17 ASSESSMENT — GAIT ASSESSMENTS
DISTANCE (FEET): 400
GAIT LEVEL OF ASSIST: SUPERVISED
DEVIATION: DECREASED BASE OF SUPPORT

## 2017-11-17 NOTE — PROGRESS NOTES
[facility] Hospitalist Progress Note    Date of Service: 2017    Chief Complaint  77 y.o. male admitted 11/15/2017 with weakness.    Interval Problem Update  Patient initially presented to outside facility, transferred here for high [facility] care.  Patient was shouting papers at his house, noted difficulty standing and then difficulty ambulating.  Patient also states over the past couple of months he's noted with ambulation if he goes through a doorway it's not uncommon that he hits the left or the right side however hitting the left side is more often.  Patient was taking aspirin, stopped taking this a couple months ago due to increased bruising.  Discussed plan patient condition with nurse at bedside.    Consultants/Specialty  None    Disposition  Patient requires additional treatment in the hospital, awaiting physical and occupational therapy to determine where he would need to go upon discharge        Review of Systems   Constitutional: Negative for chills.   HENT: Negative for congestion and hearing loss.    Respiratory: Negative for sputum production, shortness of breath and stridor.    Cardiovascular: Negative for chest pain and palpitations.   Gastrointestinal: Negative for abdominal pain, nausea and vomiting.   Genitourinary: Negative for dysuria, frequency and urgency.   Musculoskeletal: Negative for falls, myalgias and neck pain.   Neurological: Positive for weakness. Negative for dizziness, loss of consciousness and headaches.   Psychiatric/Behavioral: Negative for depression and suicidal ideas.   All other systems reviewed and are negative.     Physical Exam  Laboratory/Imaging   Hemodynamics  Temp (24hrs), Av.6 °C (97.8 °F), Min:36.2 °C (97.1 °F), Max:36.9 °C (98.5 °F)   Temperature: 36.6 °C (97.9 °F)  Pulse  Av.9  Min: 49  Max: 69    Blood Pressure : (!) 98/55 (R.N. notified)      Respiratory      Respiration: 16, Pulse Oximetry: 95 %        RUL Breath Sounds: Clear, RML Breath Sounds: Clear,  RLL Breath Sounds: Diminished, GT Breath Sounds: Clear, LLL Breath Sounds: Diminished    Fluids  No intake or output data in the 24 hours ending 11/17/17 1413    Nutrition  Orders Placed This Encounter   Procedures   • Diet Order     Standing Status:   Standing     Number of Occurrences:   1     Order Specific Question:   Diet:     Answer:   Regular [1]     Physical Exam   Constitutional:  Non-toxic appearance.   Thin and frail appearing    HENT:   Head: Normocephalic and atraumatic.   Mouth/Throat: No oropharyngeal exudate.   Eyes: Right eye exhibits no discharge. Left eye exhibits no discharge. No scleral icterus.   Neck: Neck supple. No edema and no erythema present.   Cardiovascular: Normal rate and regular rhythm.  Exam reveals no friction rub.    No murmur heard.  Pulmonary/Chest: Effort normal and breath sounds normal. No stridor. No respiratory distress. He has no wheezes. He exhibits no tenderness.   Abdominal: Soft. Bowel sounds are normal. He exhibits no distension.   Musculoskeletal: He exhibits no edema or tenderness.   Lymphadenopathy:     He has no cervical adenopathy.   Neurological: He is alert. No cranial nerve deficit.   Some confusion    Skin: Skin is warm and dry. He is not diaphoretic. No erythema.   Multiple wounds in different stages of healing   Psychiatric: He has a normal mood and affect. Judgment normal. He is slowed. Cognition and memory are impaired.   Nursing note and vitals reviewed.      Recent Labs      11/16/17 0252  11/17/17   0211   WBC  6.8  6.7   RBC  3.78*  4.17*   HEMOGLOBIN  12.4*  13.5*   HEMATOCRIT  37.8*  41.9*   MCV  100.0*  100.5*   MCH  32.8  32.4   MCHC  32.8*  32.2*   RDW  49.2  48.5   PLATELETCT  144*  184   MPV  11.5  11.0     Recent Labs      11/16/17   0252   SODIUM  139   POTASSIUM  3.8   CHLORIDE  110   CO2  25   GLUCOSE  94   BUN  12   CREATININE  0.69   CALCIUM  8.8             Recent Labs      11/16/17   0252   TRIGLYCERIDE  77   HDL  57   LDL  56           Assessment/Plan     * Ataxia- (present on admission)   Assessment & Plan    - Stroke workup including MRI of the brain negative  - Physical therapy and occupational therapy to see the patient  - concerning that this is a worsening of his parkinson's   - Continue aspirin and statin  - pt lives alone, not safe to d/c home, will need placement          Thyroid mass   Assessment & Plan    - noted on MRI, will get us        Protein-calorie malnutrition, severe (CMS-HCC)   Assessment & Plan    - tolerating diet         Thrombocytopenia (CMS-HCC)   Assessment & Plan    - Mild, patient is on Lovenox will repeat a CBC in the morning to make sure it doesn't drop significantly        Macrocytic anemia   Assessment & Plan    - No sign of gross bleeding  - No need for transfusion        Parkinson disease (CMS-HCC)- (present on admission)   Assessment & Plan    - Continue Sinemet and await therapies        Numbness and tingling in both hands- (present on admission)   Assessment & Plan    - chronic changes on MRIs  - This is been ongoing for months        Bipolar disorder (CMS-HCC)- (present on admission)   Assessment & Plan    - Continue lithium              Reviewed items::  Labs reviewed, Medications reviewed and Radiology images reviewed  DVT prophylaxis pharmacological::  Enoxaparin (Lovenox)

## 2017-11-17 NOTE — PROGRESS NOTES
Monitor Summary: SB-SR 50-66, W[place] .6, QRS .08, QT .32 with 1.5 sec pause and rare PVCs per strip from monitor room

## 2017-11-17 NOTE — THERAPY
"Physical Therapy Treatment completed.   Bed Mobility:  Supine to Sit:  (NT- up in chair)  Transfers: Sit to Stand: Supervised  Gait: Level Of Assist: Supervised with No Equipment Needed       Plan of Care: Will benefit from Physical Therapy 3 times per week  Discharge Recommendations: Equipment: Will Continue to Assess for Equipment Needs.     Pt demonstrating better funtional mobility, and LE coordination ambulating without AD SBA. Pt has tendencies to walk with narrow MARY but is able to correct with cuing. Pt able to follow commands. PT as of now anticpates pt to d/c home with home health once medically cleared. Will continue to follow.     See \"Rehab Therapy-Acute\" Patient Summary Report for complete documentation.       "

## 2017-11-17 NOTE — PROGRESS NOTES
Alaina Eason Fall Risk Assessment:     Last Known Fall: No falls  Mobility: Dizziness/generalized weakness  Medications: Cardiovascular or central nervous system meds  Mental Status/LOC/Awareness: Awake, alert, and oriented to date, place, and person  Toileting Needs: No needs  Volume/Electrolyte Status: No problems  Communication/Sensory: Visual (Glasses)/hearing deficit  Behavior: Appropriate behavior  Alaina Eason Fall Risk Total: 6  Fall Risk Level: NO RISK    Universal Fall Precautions:  call light/belongings in reach, bed in low position and locked, wheelchairs and assistive devices out of sight, siderails up x 2, use non-slip footwear, adequate lighting, clutter free and spill free environment, educate on level of risk, educate to call for assistance    Fall Risk Level Interventions:   TRIAL (TELE 8, NEURO, MED MADELINE 5) Low Fall Risk Interventions  Place yellow fall risk ID band on patient: completed  Provide patient/family education based on risk assessment: completed  Educate patient/family to call staff for assistance when getting out of bed: completed  Place fall precaution signage outside patient door: completed      Patient Specific Interventions:     Medication: review medications with patient and family  Mental Status/LOC/Awareness: reinforce falls education, check on patient hourly, utilize bed/chair fall alarm, reinforce the use of call light and provide activity  Toileting: provide frquent toileting, monitor intake and output/use of appropriate interventions, instruct patient/family on the use of grab bars and do not leave patient unattended in bathroom/refer to toileting scripting  Volume/Electrolyte Status: ensure patient remains hydrated and monitor abnormal lab values  Communication/Sensory: update plan of care on whiteboard and ensure proper positioning when transferrng/ambulating  Behavioral: encourage patient to voice feelings, engage patient in daily activities, administer medication as  ordered and instruct/reinforce fall program rationale  Mobility: schedule physical activity throughout the day, provide comfort measures during transport, dangle prior to standing, utilize bed/chair fall alarm, ensure bed is locked and in lowest position, provide appropriate assistive device and instruct patient to exit bed on their strongest side

## 2017-11-17 NOTE — PROGRESS NOTES
PT A&Ox4, denies pain, has baseline N/T in fingertips and toes. Pt ambulates with FWW with 1 person assist. No further needs at this time. Bed alarm is on, call light and personal belongings within reach

## 2017-11-17 NOTE — DISCHARGE PLANNING
Per Dr. Edmond consult:    The patient is not a candidate for inpatient rehabilitation due to the lack of clear etiology resulting in inability to accurately project a reasonable recovery from symptoms. Additionally, there is conflicting documentation regarding his current functional status, and he may currently be too high level functionally to qualify for inpatient rehabilitation.

## 2017-11-17 NOTE — CARE PLAN
Problem: Venous Thromboembolism (VTW)/Deep Vein Thrombosis (DVT) Prevention:  Goal: Patient will participate in Venous Thrombosis (VTE)/Deep Vein Thrombosis (DVT)Prevention Measures  Outcome: PROGRESSING AS EXPECTED  SCDs in place and pt is receiving Lovenox for DVT ppx    Problem: Bowel/Gastric:  Goal: Normal bowel function is maintained or improved  Outcome: PROGRESSING SLOWER THAN EXPECTED  Will initiate bowel protocol - administer PRNs - to improve bowel function

## 2017-11-17 NOTE — PROGRESS NOTES
Monitor summary: SB-SR 48-65, MD 0.20, QRS 0.10, QT 0.40, with PACs and HR down to 38 per strip from monitor room.

## 2017-11-17 NOTE — CARE PLAN
Problem: Safety  Goal: Will remain free from falls  Outcome: PROGRESSING AS EXPECTED  Safety measures in place     Problem: Discharge Barriers/Planning  Goal: Patient's continuum of care needs will be met  Outcome: PROGRESSING SLOWER THAN EXPECTED  Placement

## 2017-11-17 NOTE — DISCHARGE PLANNING
Medical Social Worker     Pt was is not a good candidate for IRF per Physiatry Consult.     SW remains available for DC Planning.

## 2017-11-17 NOTE — THERAPY
"Occupational Therapy Treatment completed with focus on ADLs, ADL transfers and patient education.  Functional Status:  Pt demonstrated ADLs with SPV however required constant VC's for redirection back to task and for sequencing while dressing. During functional mobility to bathroom, pt clipped L shoulder on doorway. Pt lives alone and reports closes person who would be available to assist at home lives 40 miles away. Pt would benefit from further skilled acute OT services in order to assess functional cognition and visual deficits.   Plan of Care: Will benefit from Occupational Therapy 3 times per week  Discharge Recommendations:  Equipment Will Continue to Assess for Equipment Needs. Post-acute therapy Discharge to home with outpatient or home health for additional skilled therapy services.    See \"Rehab Therapy-Acute\" Patient Summary Report for complete documentation.     "

## 2017-11-17 NOTE — PROGRESS NOTES
Pt is alert and oriented x4. Denies pain at this time. Currently up to chair. Expressed concerns that he live 100 miles away and has no ride home. Pt aware that he will not be discharged without his knowledge and aid from our social work team. Will continue to monitor pt.

## 2017-11-18 PROCEDURE — 700111 HCHG RX REV CODE 636 W/ 250 OVERRIDE (IP): Performed by: HOSPITALIST

## 2017-11-18 PROCEDURE — 700102 HCHG RX REV CODE 250 W/ 637 OVERRIDE(OP): Performed by: HOSPITALIST

## 2017-11-18 PROCEDURE — A9270 NON-COVERED ITEM OR SERVICE: HCPCS | Performed by: HOSPITALIST

## 2017-11-18 PROCEDURE — 99225 PR SUBSEQUENT OBSERVATION CARE,LEVEL II: CPT | Performed by: INTERNAL MEDICINE

## 2017-11-18 PROCEDURE — G0378 HOSPITAL OBSERVATION PER HR: HCPCS

## 2017-11-18 RX ADMIN — STANDARDIZED SENNA CONCENTRATE AND DOCUSATE SODIUM 2 TABLET: 8.6; 5 TABLET, FILM COATED ORAL at 08:06

## 2017-11-18 RX ADMIN — ENOXAPARIN SODIUM 40 MG: 100 INJECTION SUBCUTANEOUS at 08:07

## 2017-11-18 RX ADMIN — CLONAZEPAM 0.5 MG: 0.5 TABLET ORAL at 08:06

## 2017-11-18 RX ADMIN — CARBIDOPA AND LEVODOPA 1 TABLET: 50; 200 TABLET, EXTENDED RELEASE ORAL at 20:41

## 2017-11-18 RX ADMIN — CARBIDOPA AND LEVODOPA 2 TABLET: 25; 100 TABLET ORAL at 16:36

## 2017-11-18 RX ADMIN — PRAZOSIN HYDROCHLORIDE 4 MG: 2 CAPSULE ORAL at 20:40

## 2017-11-18 RX ADMIN — LITHIUM CARBONATE 300 MG: 300 TABLET, EXTENDED RELEASE ORAL at 20:40

## 2017-11-18 RX ADMIN — STANDARDIZED SENNA CONCENTRATE AND DOCUSATE SODIUM 2 TABLET: 8.6; 5 TABLET, FILM COATED ORAL at 20:41

## 2017-11-18 RX ADMIN — PRAVASTATIN SODIUM 20 MG: 20 TABLET ORAL at 20:40

## 2017-11-18 RX ADMIN — BUSPIRONE HYDROCHLORIDE 10 MG: 10 TABLET ORAL at 08:06

## 2017-11-18 RX ADMIN — ASPIRIN 81 MG: 81 TABLET, COATED ORAL at 08:07

## 2017-11-18 RX ADMIN — AMITRIPTYLINE HYDROCHLORIDE 25 MG: 25 TABLET, FILM COATED ORAL at 20:40

## 2017-11-18 RX ADMIN — OMEPRAZOLE 20 MG: 20 CAPSULE, DELAYED RELEASE ORAL at 08:06

## 2017-11-18 RX ADMIN — CARBIDOPA AND LEVODOPA 2 TABLET: 25; 100 TABLET ORAL at 08:08

## 2017-11-18 RX ADMIN — CARBIDOPA AND LEVODOPA 2 TABLET: 25; 100 TABLET ORAL at 11:33

## 2017-11-18 RX ADMIN — LITHIUM CARBONATE 300 MG: 300 TABLET, EXTENDED RELEASE ORAL at 08:06

## 2017-11-18 RX ADMIN — RISPERIDONE 0.5 MG: 0.5 TABLET, FILM COATED ORAL at 20:40

## 2017-11-18 RX ADMIN — NICOTINE 14 MG: 14 PATCH, EXTENDED RELEASE TRANSDERMAL at 06:07

## 2017-11-18 RX ADMIN — BUSPIRONE HYDROCHLORIDE 10 MG: 10 TABLET ORAL at 20:40

## 2017-11-18 RX ADMIN — VITAMIN D, TAB 1000IU (100/BT) 1000 UNITS: 25 TAB at 08:06

## 2017-11-18 ASSESSMENT — PATIENT HEALTH QUESTIONNAIRE - PHQ9
SUM OF ALL RESPONSES TO PHQ9 QUESTIONS 1 AND 2: 0
1. LITTLE INTEREST OR PLEASURE IN DOING THINGS: NOT AT ALL
2. FEELING DOWN, DEPRESSED, IRRITABLE, OR HOPELESS: NOT AT ALL
SUM OF ALL RESPONSES TO PHQ QUESTIONS 1-9: 0

## 2017-11-18 ASSESSMENT — COGNITIVE AND FUNCTIONAL STATUS - GENERAL
SUGGESTED CMS G CODE MODIFIER DAILY ACTIVITY: CH
DAILY ACTIVITIY SCORE: 24
MOBILITY SCORE: 24
SUGGESTED CMS G CODE MODIFIER MOBILITY: CH

## 2017-11-18 ASSESSMENT — ENCOUNTER SYMPTOMS
MYALGIAS: 0
STRIDOR: 0
SPUTUM PRODUCTION: 0
COUGH: 0
LOSS OF CONSCIOUSNESS: 0
FEVER: 0
FALLS: 0
WEAKNESS: 1
DEPRESSION: 0
ORTHOPNEA: 0
NAUSEA: 0
DIZZINESS: 0
CHILLS: 0
SHORTNESS OF BREATH: 0
VOMITING: 0
PALPITATIONS: 0

## 2017-11-18 ASSESSMENT — COPD QUESTIONNAIRES
HAVE YOU SMOKED AT LEAST 100 CIGARETTES IN YOUR ENTIRE LIFE: NO/DON'T KNOW
COPD SCREENING SCORE: 2
DO YOU EVER COUGH UP ANY MUCUS OR PHLEGM?: NO/ONLY WITH OCCASIONAL COLDS OR INFECTIONS
DURING THE PAST 4 WEEKS HOW MUCH DID YOU FEEL SHORT OF BREATH: NONE/LITTLE OF THE TIME

## 2017-11-18 ASSESSMENT — PAIN SCALES - GENERAL
PAINLEVEL_OUTOF10: 0
PAINLEVEL_OUTOF10: 0

## 2017-11-18 ASSESSMENT — LIFESTYLE VARIABLES: DO YOU DRINK ALCOHOL: NO

## 2017-11-18 NOTE — FACE TO FACE
Face to Face Supporting Documentation - Home Health    The encounter with this patient was in whole or in part the primary reason for home health admission.    Date of encounter:   Patient:                    MRN:                       YOB: 2015  Antoni Shirley  [id number]  1940     Home health to see patient for:  Skilled Nursing care for assessment, interventions & education, Physical Therapy evaluation and treatment and Occupational therapy evaluation and treatment    Skilled need for:  Exacerbation of Chronic Disease State parkinson's, causing worse weakness    Skilled nursing interventions to include:  Comment: general medical care    Homebound status evidenced by:  Need the aid of supportive devices such as crutches, canes, wheelchairs or walkers. Leaving home requires a considerable and taxing effort. There is a normal inability to leave the home.    Community Physician to provide follow up care: Ky Bills P.A.-C.     Optional Interventions? No      I certify the face to face encounter for this home health care referral meets the CMS requirements and the encounter/clinical assessment with the patient was, in whole, or in part, for the medical condition(s) listed above, which is the primary reason for home health care. Based on my clinical findings: the service(s) are medically necessary, support the need for home health care, and the homebound criteria are met.  I certify that this patient has had a face to face encounter by myself.  Fabian Cronin D.O. - NPI: [id number]

## 2017-11-18 NOTE — DISCHARGE PLANNING
Received choice form from [facility] at 1228.  Referral sent to [facility] at 1215 on 11-18-17,

## 2017-11-18 NOTE — CARE PLAN
Problem: Safety  Goal: Will remain free from injury    Intervention: Provide assistance with mobility  1:1 call bell in reach, use of walker as needed, alarms in place, rings for ast

## 2017-11-18 NOTE — DISCHARGE PLANNING
Per [facility] request, spoke with Melani in [facility], they are unable to go to [place] today.  Per Melani they would review tomorrow depending on weather.  [facility] advised,

## 2017-11-18 NOTE — DISCHARGE PLANNING
Medical Social Work    SW notified Abdoulaye YAN in emergency that pt needs [employer] transport.  SW waiting for acceptance from [facility] waiting to confirm time.

## 2017-11-18 NOTE — PROGRESS NOTES
[facility] Progress Note    Date of Service: 2017    Chief Complaint  77 y.o. male admitted 11/15/2017 with weakness.    Interval Problem Update  Patient initially presented to outside facility, transferred here for high [facility] care.  Patient was shredding papers at his house, noted difficulty standing and then difficulty ambulating.  Patient also states over the past couple of months he's noted with ambulation if he goes through a doorway it's not uncommon that he hits the left or the right side however hitting the left side is more often.  Patient was taking aspirin, stopped taking this a couple months ago due to increased bruising.  No stroke noted on MRI brain.  Discussed plan patient condition with nurse at bedside.    Consultants/Specialty  None    Disposition  Patient requires additional treatment in the hospital, awaiting physical and occupational therapy to determine where he would need to go upon discharge        Review of Systems   Constitutional: Negative for chills and fever.   HENT: Negative for congestion.    Respiratory: Negative for cough, sputum production, shortness of breath and stridor.    Cardiovascular: Negative for chest pain, palpitations and orthopnea.   Gastrointestinal: Negative for nausea and vomiting.   Genitourinary: Negative for dysuria and urgency.   Musculoskeletal: Negative for falls and myalgias.   Neurological: Positive for weakness. Negative for dizziness and loss of consciousness.   Psychiatric/Behavioral: Negative for depression and suicidal ideas.   All other systems reviewed and are negative.     Physical Exam  Laboratory/Imaging   Hemodynamics  Temp (24hrs), Av.4 °C (97.6 °F), Min:36.1 °C (97 °F), Max:36.8 °C (98.2 °F)   Temperature: 36.6 °C (97.9 °F)  Pulse  Av.5  Min: 49  Max: 74    Blood Pressure : 105/64      Respiratory      Respiration: 14, Pulse Oximetry: 91 %        RUL Breath Sounds: Clear, RML Breath Sounds: Clear, RLL Breath Sounds: Clear, GT  Breath Sounds: Clear, LLL Breath Sounds: Clear    Fluids  No intake or output data in the 24 hours ending 11/18/17 1354    Nutrition  Orders Placed This Encounter   Procedures   • Diet Order     Standing Status:   Standing     Number of Occurrences:   1     Order Specific Question:   Diet:     Answer:   Regular [1]     Physical Exam   Constitutional:  Non-toxic appearance. No distress.   Thin and frail appearing    HENT:   Head: Normocephalic and atraumatic.   Eyes: Right eye exhibits no discharge. Left eye exhibits no discharge.   Neck: Neck supple. No edema and no erythema present.   Cardiovascular: Normal rate and regular rhythm.    No murmur heard.  Pulmonary/Chest: Effort normal and breath sounds normal. No stridor. No respiratory distress. He exhibits no tenderness.   Abdominal: Soft. He exhibits no distension.   Musculoskeletal: He exhibits no edema.   Neurological: He is alert.   Some confusion    Skin: Skin is warm and dry. He is not diaphoretic.   Multiple wounds in different stages of healing   Psychiatric: He has a normal mood and affect. Judgment and thought content normal. He is slowed. Cognition and memory are impaired.   Nursing note and vitals reviewed.      Recent Labs      11/16/17   0252  11/17/17   0211   WBC  6.8  6.7   RBC  3.78*  4.17*   HEMOGLOBIN  12.4*  13.5*   HEMATOCRIT  37.8*  41.9*   MCV  100.0*  100.5*   MCH  32.8  32.4   MCHC  32.8*  32.2*   RDW  49.2  48.5   PLATELETCT  144*  184   MPV  11.5  11.0     Recent Labs      11/16/17   0252   SODIUM  139   POTASSIUM  3.8   CHLORIDE  110   CO2  25   GLUCOSE  94   BUN  12   CREATININE  0.69   CALCIUM  8.8             Recent Labs      11/16/17   0252   TRIGLYCERIDE  77   HDL  57   LDL  56          Assessment/Plan     * Ataxia- (present on admission)   Assessment & Plan    - Stroke workup including MRI of the brain negative  - Physical therapy and occupational therapy following, placement versus home health  - concerning that this is a  worsening of his parkinson's   - Continue aspirin and statin  - pt lives alone, not safe to d/c home, may need placement but does seem to be improving          Thyroid mass   Assessment & Plan    -Noted bilateral nodules, outpatient follow-up        Protein-calorie malnutrition, severe (CMS-HCC)   Assessment & Plan    - tolerating diet         Thrombocytopenia (CMS-HCC)   Assessment & Plan    -Resolved        Macrocytic anemia   Assessment & Plan    - No sign of gross bleeding  - No need for transfusion        Parkinson disease (CMS-HCC)- (present on admission)   Assessment & Plan    - Continue Sinemet and await therapies        Numbness and tingling in both hands- (present on admission)   Assessment & Plan    - chronic changes on MRIs  - This is been ongoing for months        Bipolar disorder (CMS-HCC)- (present on admission)   Assessment & Plan    - Continue lithium              Reviewed items::  Labs reviewed, Medications reviewed and Radiology images reviewed  DVT prophylaxis pharmacological::  Enoxaparin (Lovenox)

## 2017-11-18 NOTE — PROGRESS NOTES
Pt OOB to chair this Am upon initial assessment. Pt A&Ox4, follows all commands at this time. Pt PERRLA intact.  and push pulls equal and strong. Pt has tremors present at times, Hx of Parkinson's. Pt ast x1 with walker. Pt speech clear, no dysphagia present at this time. Pt tele in place, NSR/SB, pulses are all palpable, no edema present at this time. Pt lungs are clear in Ra. Pt bowel sounds are active to all four quads, tolerating diet well, eating 100%. Pt voiding on own in bathroom at this time. Pt has no skin issues present, fragile and intact. Pt Iv hep locked at this time. Pt remains Oob to chair at this time. Pt showering this Am. Pt family updated on care. Call bell in reach, will follow up as needed. Pt to go home with Home Health care later today if cleared.

## 2017-11-18 NOTE — PROGRESS NOTES
Pt. alert and oriented x 4 w/o any complaints of pain at this time. Pt. still complaining of numbness to bilateral fingertips. POC discussed. Bed alarm on. Call light in reach.

## 2017-11-18 NOTE — DISCHARGE PLANNING
Medical Social Work    Per pts chart, pt needs a HH referral. SW talked to pt at bedside who requested that referrals be sent to [facility]. SW addressed all questions and encouraged follow up as needed. SW sent the choice form to [facility] and confirmed receipt of choice form. SW to monitor response status and follow up with care team.

## 2017-11-18 NOTE — CARE PLAN
Problem: Communication  Goal: The ability to communicate needs accurately and effectively will improve    Intervention: Educate patient and significant other/support system about the plan of care, procedures, treatments, medications and allow for questions  POC, discharge POC, needs, results, labs,

## 2017-11-18 NOTE — PROGRESS NOTES
Alaina Eason Fall Risk Assessment:     Last Known Fall: No falls  Mobility: Dizziness/generalized weakness  Medications: Cardiovascular or central nervous system meds  Mental Status/LOC/Awareness: Awake, alert, and oriented to date, place, and person  Toileting Needs: No needs  Volume/Electrolyte Status: No problems  Communication/Sensory: Visual (Glasses)/hearing deficit  Behavior: Appropriate behavior  Alaina Eason Fall Risk Total: 6  Fall Risk Level: NO RISK    Universal Fall Precautions:  call light/belongings in reach, bed in low position and locked, wheelchairs and assistive devices out of sight, siderails up x 2, use non-slip footwear, adequate lighting, educate on level of risk, clutter free and spill free environment, educate to call for assistance    Fall Risk Level Interventions:   TRIAL (TELE 8, NEURO, MED MADELINE 5) Low Fall Risk Interventions  Place yellow fall risk ID band on patient: completed  Provide patient/family education based on risk assessment: completed  Educate patient/family to call staff for assistance when getting out of bed: completed  Place fall precaution signage outside patient door: completed      Patient Specific Interventions:     Medication: review medications with patient and family, assess for medications that can be discontinued or dosage decreased and limit combination of prn medications  Mental Status/LOC/Awareness: reorient patient, reinforce falls education, utilize bed/chair fall alarm and reinforce the use of call light  Toileting: provide frquent toileting, instruct patient/family on the use of grab bars and instruct patient/family on the need to call for assistance when toileting  Volume/Electrolyte Status: ensure patient remains hydrated and monitor abnormal lab values  Communication/Sensory: ensure proper positioning when transferrng/ambulating and ensure patient has glasses/contacts and hearing aids/dentures  Behavioral: engage patient in daily activities and  instruct/reinforce fall program rationale  Mobility: dangle prior to standing, utilize bed/chair fall alarm and ensure bed is locked and in lowest position

## 2017-11-19 VITALS
SYSTOLIC BLOOD PRESSURE: 112 MMHG | TEMPERATURE: 97.8 F | HEART RATE: 68 BPM | DIASTOLIC BLOOD PRESSURE: 66 MMHG | HEIGHT: 67 IN | RESPIRATION RATE: 18 BRPM | OXYGEN SATURATION: 94 % | BODY MASS INDEX: 23.43 KG/M2 | WEIGHT: 149.25 LBS

## 2017-11-19 PROBLEM — R27.0 ATAXIA: Status: RESOLVED | Noted: 2017-11-15 | Resolved: 2017-11-19

## 2017-11-19 PROBLEM — D69.6 THROMBOCYTOPENIA (HCC): Status: RESOLVED | Noted: 2017-11-16 | Resolved: 2017-11-19

## 2017-11-19 PROBLEM — R20.2 NUMBNESS AND TINGLING IN BOTH HANDS: Status: RESOLVED | Noted: 2017-11-15 | Resolved: 2017-11-19

## 2017-11-19 PROBLEM — R20.0 NUMBNESS AND TINGLING IN BOTH HANDS: Status: RESOLVED | Noted: 2017-11-15 | Resolved: 2017-11-19

## 2017-11-19 PROCEDURE — 99217 PR OBSERVATION CARE DISCHARGE: CPT | Performed by: INTERNAL MEDICINE

## 2017-11-19 PROCEDURE — 90471 IMMUNIZATION ADMIN: CPT

## 2017-11-19 PROCEDURE — G0378 HOSPITAL OBSERVATION PER HR: HCPCS

## 2017-11-19 PROCEDURE — 700111 HCHG RX REV CODE 636 W/ 250 OVERRIDE (IP): Performed by: HOSPITALIST

## 2017-11-19 PROCEDURE — A9270 NON-COVERED ITEM OR SERVICE: HCPCS | Performed by: HOSPITALIST

## 2017-11-19 PROCEDURE — 700102 HCHG RX REV CODE 250 W/ 637 OVERRIDE(OP): Performed by: HOSPITALIST

## 2017-11-19 PROCEDURE — 90662 IIV NO PRSV INCREASED AG IM: CPT | Performed by: HOSPITALIST

## 2017-11-19 RX ADMIN — ENOXAPARIN SODIUM 40 MG: 100 INJECTION SUBCUTANEOUS at 07:56

## 2017-11-19 RX ADMIN — VITAMIN D, TAB 1000IU (100/BT) 1000 UNITS: 25 TAB at 07:56

## 2017-11-19 RX ADMIN — CARBIDOPA AND LEVODOPA 2 TABLET: 25; 100 TABLET ORAL at 07:56

## 2017-11-19 RX ADMIN — OMEPRAZOLE 20 MG: 20 CAPSULE, DELAYED RELEASE ORAL at 07:56

## 2017-11-19 RX ADMIN — LITHIUM CARBONATE 300 MG: 300 TABLET, EXTENDED RELEASE ORAL at 07:56

## 2017-11-19 RX ADMIN — CLONAZEPAM 0.5 MG: 0.5 TABLET ORAL at 07:56

## 2017-11-19 RX ADMIN — NICOTINE 14 MG: 14 PATCH, EXTENDED RELEASE TRANSDERMAL at 05:18

## 2017-11-19 RX ADMIN — INFLUENZA A VIRUSA/MICHIGAN/45/2015 X-275 (H1N1) ANTIGEN (FORMALDEHYDE INACTIVATED), INFLUENZA A VIRUS A/HONG KONG/4801/2014 X-263B (H3N2) ANTIGEN (FORMALDEHYDE INACTIVATED), AND INFLUENZA B VIRUS B/BRISBANE/60/2008 ANTIGEN (FORMALDEHYDE INACTIVATED) 0.5 ML: 60; 60; 60 INJECTION, SUSPENSION INTRAMUSCULAR at 06:08

## 2017-11-19 RX ADMIN — STANDARDIZED SENNA CONCENTRATE AND DOCUSATE SODIUM 2 TABLET: 8.6; 5 TABLET, FILM COATED ORAL at 07:57

## 2017-11-19 RX ADMIN — ASPIRIN 81 MG: 81 TABLET, COATED ORAL at 07:56

## 2017-11-19 RX ADMIN — BUSPIRONE HYDROCHLORIDE 10 MG: 10 TABLET ORAL at 07:56

## 2017-11-19 RX ADMIN — CARBIDOPA AND LEVODOPA 2 TABLET: 25; 100 TABLET ORAL at 11:33

## 2017-11-19 ASSESSMENT — COGNITIVE AND FUNCTIONAL STATUS - GENERAL
SUGGESTED CMS G CODE MODIFIER MOBILITY: CH
MOBILITY SCORE: 24
SUGGESTED CMS G CODE MODIFIER DAILY ACTIVITY: CH
DAILY ACTIVITIY SCORE: 24

## 2017-11-19 ASSESSMENT — COPD QUESTIONNAIRES
DURING THE PAST 4 WEEKS HOW MUCH DID YOU FEEL SHORT OF BREATH: NONE/LITTLE OF THE TIME
DO YOU EVER COUGH UP ANY MUCUS OR PHLEGM?: NO/ONLY WITH OCCASIONAL COLDS OR INFECTIONS
HAVE YOU SMOKED AT LEAST 100 CIGARETTES IN YOUR ENTIRE LIFE: NO/DON'T KNOW
COPD SCREENING SCORE: 2

## 2017-11-19 ASSESSMENT — PATIENT HEALTH QUESTIONNAIRE - PHQ9
SUM OF ALL RESPONSES TO PHQ QUESTIONS 1-9: 0
1. LITTLE INTEREST OR PLEASURE IN DOING THINGS: NOT AT ALL
2. FEELING DOWN, DEPRESSED, IRRITABLE, OR HOPELESS: NOT AT ALL
SUM OF ALL RESPONSES TO PHQ9 QUESTIONS 1 AND 2: 0

## 2017-11-19 ASSESSMENT — PAIN SCALES - GENERAL
PAINLEVEL_OUTOF10: 0
PAINLEVEL_OUTOF10: 0

## 2017-11-19 ASSESSMENT — LIFESTYLE VARIABLES: DO YOU DRINK ALCOHOL: NO

## 2017-11-19 NOTE — PROGRESS NOTES
Pt resting in bed comfortably at this time eating dinner. Pt updated on POC. Will follow up tomorrow on Pt plan of discharge.

## 2017-11-19 NOTE — CARE PLAN
Problem: Communication  Goal: The ability to communicate needs accurately and effectively will improve  Outcome: PROGRESSING AS EXPECTED  Pt. calling appropriately for all needs/concerns.       Problem: Safety  Goal: Will remain free from falls  Outcome: PROGRESSING AS EXPECTED  Pt. Calling nursing staff prior to getting out of bed.

## 2017-11-19 NOTE — DISCHARGE PLANNING
Medical SW    Referral: Sw to f/u on pt's d/c today.     Intervention: Sw noted yesterday pt not d/c via [employer] and [facility] not open on weekends to acquire acceptance.    Sw advised pt's son will p/u pt and stay w/ him for a week. Sw learned later son will move pt to a new address. Son was provided number of [facility] to f/u on Monday w/ referral.    Plan: Sw to assist w/ d/c planning as needed.

## 2017-11-19 NOTE — DISCHARGE INSTRUCTIONS
Discharge Instructions    Discharged to home by car with relative. Discharged via wheelchair, hospital escort: Yes.  Special equipment needed: Not Applicable    Be sure to schedule a follow-up appointment with your primary care doctor or any specialists as instructed.     Discharge Plan:   Smoking Cessation Offered: Patient Refused  Influenza Vaccine Indication: Indicated: 65 years and older  Influenza Vaccine Given - only chart on this line when given: Influenza Vaccine Given (See MAR)    I understand that a diet low in cholesterol, fat, and sodium is recommended for good health. Unless I have been given specific instructions below for another diet, I accept this instruction as my diet prescription.   Other diet: Regular     Special Instructions: None    · Is patient discharged on Warfarin / Coumadin?   No     · Is patient Post Blood Transfusion?  No    Depression / Suicide Risk    As you are discharged from this [facility] facility, it is important to learn how to keep safe from harming yourself.    Recognize the warning signs:  · Abrupt changes in personality, positive or negative- including increase in energy   · Giving away possessions  · Change in eating patterns- significant weight changes-  positive or negative  · Change in sleeping patterns- unable to sleep or sleeping all the time   · Unwillingness or inability to communicate  · Depression  · Unusual sadness, discouragement and loneliness  · Talk of wanting to die  · Neglect of personal appearance   · Rebelliousness- reckless behavior  · Withdrawal from people/activities they love  · Confusion- inability to concentrate     If you or a loved one observes any of these behaviors or has concerns about self-harm, here's what you can do:  · Talk about it- your feelings and reasons for harming yourself  · Remove any means that you might use to hurt yourself (examples: pills, rope, extension cords, firearm)  · Get professional help from the community (Mental  Health, Substance Abuse, psychological counseling)  · Do not be alone:Call your Safe Contact- someone whom you trust who will be there for you.  · Call your local CRISIS HOTLINE 931-0999 or 729-653-4352  · Call your local Children's Mobile Crisis Response Team [place] (370) 476-5707 or www.[employer]gecko  · Call the toll free National Suicide Prevention Hotlines   · National Suicide Prevention Lifeline 318-628-BTPY (8745)  · National Hope Line Network 800-SUICIDE (006-2175)

## 2017-11-19 NOTE — PROGRESS NOTES
Monitor summary: SB 53- SR 84, NV .16, QRS .08, QT .42, with frequent PACs per strip from monitor room.

## 2017-11-19 NOTE — CARE PLAN
Problem: Safety  Goal: Will remain free from injury    Intervention: Provide assistance with mobility  1:1 with walker, alarm set, call bell in reach

## 2017-11-19 NOTE — CARE PLAN
Problem: Communication  Goal: The ability to communicate needs accurately and effectively will improve    Intervention: Educate patient and significant other/support system about the plan of care, procedures, treatments, medications and allow for questions  Labs, results, discharge, POC discussed.

## 2017-11-19 NOTE — PROGRESS NOTES
Alaina Eason Fall Risk Assessment:     Last Known Fall: No falls  Mobility: No limitations  Medications: No meds  Mental Status/LOC/Awareness: Awake, alert, and oriented to date, place, and person  Toileting Needs: No needs  Volume/Electrolyte Status: No problems  Communication/Sensory: Visual (Glasses)/hearing deficit  Behavior: Appropriate behavior  Alaina Eason Fall Risk Total: 4  Fall Risk Level: NO RISK    Universal Fall Precautions:  call light/belongings in reach, bed in low position and locked, wheelchairs and assistive devices out of sight, siderails up x 2, use non-slip footwear, adequate lighting, clutter free and spill free environment, educate on level of risk, educate to call for assistance    Fall Risk Level Interventions:   TRIAL (TELE 8, NEURO, MED MADELINE 5) Low Fall Risk Interventions  Place yellow fall risk ID band on patient: verified  Provide patient/family education based on risk assessment: verified  Educate patient/family to call staff for assistance when getting out of bed: verified  Place fall precaution signage outside patient door: verified      Patient Specific Interventions:     Medication: review medications with patient and family and limit combination of prn medications  Mental Status/LOC/Awareness: reinforce falls education, check on patient hourly and utilize bed/chair fall alarm  Toileting: provide frquent toileting, instruct male patients prone to dizziness to void while sitting, instruct patient/family on the use of grab bars and instruct patient/family on the need to call for assistance when toileting  Volume/Electrolyte Status: ensure patient remains hydrated and monitor abnormal lab values  Communication/Sensory: ensure proper positioning when transferrng/ambulating and ensure patient has glasses/contacts and hearing aids/dentures  Behavioral: engage patient in daily activities, administer medication as ordered and instruct/reinforce fall program rationale  Mobility: utilize  bed/chair fall alarm and ensure bed is locked and in lowest position

## 2017-11-19 NOTE — DISCHARGE SUMMARY
CHIEF COMPLAINT ON ADMISSION  Chief Complaint   Patient presents with   • Legal 2000     Pt bib ems from [facility]. Per ems pt presented to [facility]. Unable to walk in a straight line, and bumping into walls per Pt the symptoms started appx 1 hour before he presented to the hospital there.        CODE STATUS  Full Code    HPI & HOSPITAL COURSE  This is a 77 y.o. male here withWeakness. Initially was a concern for stroke, MRI brain showed no acute stroke. Was determined that this is just a worsening of his Parkinson's disease, with physical and occupational therapy he did improve. Initially he was weak and would not have been safe for home however with his improvement he is good for home with home health. Currently home health is being worked on, information is given to the patient but since it's the weekend of this is not completely been set up at there is no reason to suspect it would not be. Patient's son has driven in town and is going to take him home and stay with him for the week to get everything set up. Patient is requesting discharge home at this point in time with all the plan in place I feel it is safe to do so. His ataxia is mostly resolved, he does still have some weakness but he is like I said improved. Did have some numbness and tingling in his hands and this is resolved.    Therefore, he is discharged in fair and stable condition with close outpatient follow-up.    SPECIFIC OUTPATIENT FOLLOW-UP  Follow-up with primary care provider within one week  Follow-up with home health tomorrow, information is been given by .  Emergency department or 911 in case of emergency or worsening    DISCHARGE PROBLEM LIST  Principal Problem (Resolved):    Ataxia POA: Yes  Active Problems:    Bipolar disorder (CMS-HCC) POA: Yes    Parkinson disease (CMS-HCC) POA: Yes    Macrocytic anemia POA: Unknown    Protein-calorie malnutrition, severe (CMS-HCC) POA: Unknown    Thyroid mass POA:  Unknown  Resolved Problems:    Numbness and tingling in both hands POA: Yes    Thrombocytopenia (CMS-HCC) POA: Unknown      FOLLOW UP  No future appointments.  [facility] ([facility])  [address] [place] [address] [id number] [phone]  Call on 11/20/2017  To confirm acceptance      MEDICATIONS ON DISCHARGE   Antoni Shirley   Home Medication Instructions JOHN:[id number]    Printed on:11/19/17 1335   Medication Information                      amitriptyline (ELAVIL) 25 MG Tab  Take 25 mg by mouth every bedtime.             betamethasone valerate (VALISONE) 0.1 % Cream  Apply 1 Application to affected area(s) 2 times a day. Left foot             busPIRone (BUSPAR) 10 MG Tab  Take 10 mg by mouth 2 times a day. Indications: Symptoms of Feeling Anxious             carbidopa-levodopa (SINEMET)  MG Tab  Take 2 Tabs by mouth 3 times a day, with meals.             carbidopa-levodopa SR (SINEMET CR)  MG per tablet  Take 1 Tab by mouth every bedtime. Indications: Parkinson's Disease             clonazepam (KLONOPIN) 0.5 MG Tab  Take 0.5 mg by mouth every day. Indications: Panic Disorder             hydrocodone-acetaminophen (NORCO) 5-325 MG Tab per tablet  Take 1-2 Tabs by mouth every four hours as needed.             lithium CR (LITHOBID) 300 MG Tab CR  Take 300 mg by mouth 2 times a day. Indications: mood stabilizer             losartan (COZAAR) 25 MG Tab  Take 25 mg by mouth every evening. Indications: blood pressure             omeprazole (PRILOSEC) 20 MG Tablet Delayed Response delayed-release tablet  Take 1 Tab by mouth every day.             pravastatin (PRAVACHOL) 40 MG tablet  Take 20 mg by mouth every evening. Indications: high chloesterol             prazosin (MINIPRESS) 2 MG Cap  Take 4 mg by mouth every bedtime.             risperidone (RISPERDAL) 0.5 MG Tab  Take 0.5 mg by mouth every bedtime. Indications: Schizophrenia             tizanidine (ZANAFLEX) 2 MG tablet  Take 1 Tab by mouth  3 times a day as needed (As needed for muscle spasm).             urea 10 % lotion  Apply 1 Application to affected area(s) 2 Times a Day.             vitamin D (CHOLECALCIFEROL) 1000 UNIT Tab  Take 1,000 Units by mouth every day.                 DIET  Orders Placed This Encounter   Procedures   • Diet Order     Standing Status:   Standing     Number of Occurrences:   1     Order Specific Question:   Diet:     Answer:   Regular [1]   • DISCONTINUE DIET TRAY     Standing Status:   Standing     Number of Occurrences:   1       ACTIVITY  As tolerated.  Weight bearing as tolerated      CONSULTATIONS  None    PROCEDURES  None    LABORATORY  Lab Results   Component Value Date/Time    SODIUM 139 11/16/2017 02:52 AM    POTASSIUM 3.8 11/16/2017 02:52 AM    CHLORIDE 110 11/16/2017 02:52 AM    CO2 25 11/16/2017 02:52 AM    GLUCOSE 94 11/16/2017 02:52 AM    BUN 12 11/16/2017 02:52 AM    CREATININE 0.69 11/16/2017 02:52 AM        Lab Results   Component Value Date/Time    WBC 6.7 11/17/2017 02:11 AM    HEMOGLOBIN 13.5 (L) 11/17/2017 02:11 AM    HEMATOCRIT 41.9 (L) 11/17/2017 02:11 AM    PLATELETCT 184 11/17/2017 02:11 AM        Total time of the discharge process exceeds 36 minutes

## 2017-11-19 NOTE — PROGRESS NOTES
Pt ready to discharge at this time. Pt medications, belongings, and paperwork sent with Pt. Pt Iv removed. Pt tele removed. Pt son present to transport Pt. Pt discharge instructions provided. All questions, comments, concerns, answered for Pt and family at this time. Pt okay to D/C and follow up with home health acceptance on Monday per Dr. Cronin. Social work sent needed paperwork and documentation, Pt should hear Monday. Pt son to stay with Pt also x1 week, also updated on Pt follow up needs and discharge POC. Pt to discharge at this time. Transport to provide discharge ast.

## 2017-11-19 NOTE — PROGRESS NOTES
Pt resting in bed comfortably this Am upon assessment. Pt A&Ox4, follows all commands at this time. Pt PERRLA intact. Pt speech is clear, no dysphagia present at this time. Pt  and push pulls equal and strong.  Pt denies pain this Am. Pt states still numbness to finger tips and toes. Pt tele on NSR/SB, pulses palpable, no edema present at this time. Pt SCD's on, lovenox on. Pt on RA tolerating well, clear to all lobes. Pt bowel sounds active to all four quads, tolerating diet well at this time. Pt voiding on own in bathroom. Pt has no skin issues present at this time. Pt ambulating well with x1 stand by and walker. Pt POC updated and discharge POC discussed also with Dr. Cronin. Pt son coming to transport Pt home and stay with Pt x1 week. Pt okay to discharge home and set up home health on Monday. Social work made aware, paperwork sent, will follow up Monday. Call bell in reach. Will follow up as needed.

## 2017-11-19 NOTE — PROGRESS NOTES
Pt. is alert and oriented x 4 w/o any complaints of pain at this time. Telemetry monitor in place and showing sinus rhythm. Pt. ambulated to to bathroom via SBA and tolerating well. Pt. still complaining of numbness to bilateral fingertips but states it is improving. POC discussed with pt. All questions/concerns addressed at this time. Call light in reach. Hourly rounding in place.

## 2017-11-20 NOTE — PROGRESS NOTES
Monitor Summary: SR 50-70, UT .20, QRS .10, QT .40 with pac kiel to 40 per strip from monitor room

## 2018-02-09 NOTE — ADDENDUM NOTE
Encounter addended by: Saniya Ruiz R.N. on: 2/8/2018  5:45 PM<BR>    Actions taken: Flowsheet accepted

## 2019-12-10 ENCOUNTER — NON-PROVIDER VISIT (OUTPATIENT)
Dept: NEUROLOGY | Facility: MEDICAL CENTER | Age: 79
End: 2019-12-10
Payer: MEDICARE

## 2019-12-10 DIAGNOSIS — G60.3 IDIOPATHIC PROGRESSIVE POLYNEUROPATHY: ICD-10-CM

## 2019-12-10 PROCEDURE — 95886 MUSC TEST DONE W/N TEST COMP: CPT | Mod: RT | Performed by: SPECIALIST

## 2019-12-10 PROCEDURE — 95885 MUSC TST DONE W/NERV TST LIM: CPT | Mod: XS,LT | Performed by: SPECIALIST

## 2019-12-10 PROCEDURE — 95910 NRV CNDJ TEST 7-8 STUDIES: CPT | Performed by: SPECIALIST

## 2019-12-11 NOTE — PROCEDURES
NERVE CONDUCTION STUDIES AND ELECTROMYOGRAPHY REPORT        12/10/19      Referring provider: DANY Brewer M.D.      SUMMARY OF PATIENT'S CLINICAL HISTORY,PHYSICAL EXAM, AND RATIONALE FOR TESTING:    Mr. Antoni Shirley 79 y.o. presenting with bilateral upper extremity numbness and muscle atrophy.    Past Medical History is significant for :   Past Medical History:   Diagnosis Date   • Cold    • Dental disorder     full upper and lower dentures   • High cholesterol    • Hypertension    • Pain     back   • Psychiatric problem     bipolar           The electrodiagnostic studies were performed to evaluate for possible peripheral neuropathies versus radiculopathy.      ELECTRODIAGNOSTIC EXAMINATION:  Nerve conduction studies (NCS) and electromyography (EMG) are utilized to evaluate direct or indirect damage to the peripheral nervous system. NCS are performed to measure the nerve(s) response(s) to electrostimulation across a given nerve segment. EMG evaluates the passive and active electrical activity of the muscle(s) in question.  Muscles are innervated by specific peripheral nerves and roots. Often times, several nerves the muscle to be examined in order to determine the presence or absence of the disease process. Furthermore, nerves and muscles may need to be tested in a [id number] comparison, as well as in additional extremities, as this may be crucial in characterizing the extent of the disease process, which may be diffuse or isolated and of varying degree of severity. The extent of the neurodiagnostic exam is justified as it may help arrive to a proper diagnosis, which ultimately may contribute to better management of the patient. Therefore, the nerves to muscles examined during the study were medically necessary.    Unless otherwise noted, temperature of the extremity(s) study was monitored before and during the examination and remained between 32 and 36 degrees C for the upper extremities, and between 30  and 36 degrees C for the lower extremities.      NERVE CONDUCTION STUDIES:  Sensory nerves:  - Bilateral Median sensory nerves were examined.The responses were abnormal bilaterally.  Onset latencies were normal bilaterally but amplitudes were decreased to 6.2 µV on the left and 4.5 µV on the right.  - Bilateral Ulnar sensory nerves were examined. The responses normal on the right side.  On the left side onset latency was normal but amplitude was decreased to 3.8 µV.    Motor nerves:   - Bilateral Median motor nerves were examined. Recording electrodes placed at the Abductor Pollicis Brevis muscles. The response was normal on the left side.  The response on the right side was abnormal with a prolonged onset latency of 4.8 ms and a decreased amplitude of 4.4 mV.  - Bilateral Ulnar motor nerves were examined. Recording electrodes placed at the Abductor Digiti Minimi muscles. The response were abnormal bilaterally.  Onset latency was mildly prolonged at 3.4 ms on the left and 3.3 ms on the right, amplitude was diminished to 5 mV bilaterally and conduction velocity slowed across the elbow with a fall in amplitude bilaterally.      ELECTROMYOGRAPHY:  The study was performed the concentric needle electrode. Fibrillation and fasciculation activity is graded by convention from none (0) to continuous (4+).  Needle electrode examination was performed in the following muscles: Bilateral deltoid, biceps, triceps, abductor pollicis brevis and first dorsal interosseous.    On the right side chronic neuropathic changes with decreased recruitment of increased amplitude potentials were noted in the abductor pollicis brevis and first dorsal interosseous.  The deltoid biceps and triceps were normal electrophysiologically.    On the left side chronic neuropathic changes were noted in the first dorsal interosseous muscle with decreased recruitment of increased amplitude potentials.  In addition acute denervation changes with fibrillation  potentials were noted in that muscle.  The deltoid, biceps, triceps, and abductor pollicis brevis muscles were normal electrophysiologically on the left.      Nerve Conduction Studies     Stim Site NR Onset (ms) Norm Onset (ms) O-P Amp (µV) Norm O-P Amp Site1 Site2 Delta-P (ms) Dist (cm) Reginald (m/s) Norm Reginald (m/s)   Left Median Anti Sensory (2nd Digit)   Wrist    2.4 <3.8 *6.2 >10 Wrist 2nd Digit 3.6 14.0 *39 >50   Right Median Anti Sensory (2nd Digit)   Wrist    2.9 <3.8 *4.5 >10 Wrist 2nd Digit 3.5 14.0 *40 >50   Left Ulnar Anti Sensory (5th Digit)   Wrist    3.0 <3.2 *3.8 >5 Wrist 5th Digit 3.5 14.0 *40 >50   Right Ulnar Anti Sensory (5th Digit)   Wrist    3.2 <3.2 5.1 >5 Wrist 5th Digit 4.2 14.0 *33 >50        Stim Site NR Onset (ms) Norm Onset (ms) O-P Amp (mV) Norm O-P Amp Site1 Site2 Delta-0 (ms) Dist (cm) Reginald (m/s) Norm Reginald (m/s)   Left Median Motor (Abd Poll Brev)   Wrist    3.9 <4 6.4 >5 Elbow Wrist 5.7 27.0 *47 >50   Elbow    9.6  2.6          Right Median Motor (Abd Poll Brev)   Wrist    *4.8 <4 *4.4 >5 Elbow Wrist 5.7 29.0 51 >50   Elbow    10.5  2.4          Left Ulnar Motor (Abd Dig Min)   Wrist    *3.4 <3.1 *5.0 >7 B Elbow Wrist 4.3 20.5 *48 >50   B Elbow    7.7  2.9  A Elbow B Elbow 2.8 10.0 36    A Elbow    10.5  2.6          Right Ulnar Motor (Abd Dig Min)   Wrist    *3.3 <3.1 *5.0 >7 B Elbow Wrist 3.7 21.0 57 >50   B Elbow    7.0  2.9  A Elbow B Elbow 2.2 10.0 45    A Elbow    9.2  2.6                                Electromyography     Side Muscle Nerve Root Ins Act Fibs Psw Amp Dur Poly Recrt Int Pat Comment   Right Deltoid Axillary C5-6 Nml Nml Nml Nml Nml 0 Nml Nml    Right Biceps Musculocut C5-6 Nml Nml Nml Nml Nml 0 Nml Nml    Right Triceps Radial C6-7-8 Nml Nml Nml Nml Nml 0 Nml Nml    Right Abd Poll Brev Median C8-T1 Nml Nml Nml *Incr Nml 0 *Reduced *75%    Right 1stDorInt Ulnar C8-T1 *Incr *2+ Nml *Incr Nml 0 *Reduced *75%    Left Deltoid Axillary C5-6 Nml Nml Nml Nml Nml 0 Nml Nml    Left  Biceps Musculocut C5-6 Nml Nml Nml Nml Nml 0 Nml Nml    Left Triceps Radial C6-7-8 Nml Nml Nml Nml Nml 0 Nml Nml          DIAGNOSTIC INTERPRETATION:  Extensive electrodiagnostic studies were performed to the bilateral upper extremities.  The results are as follows:    1.  Right carpal tunnel syndrome which is moderate electrophysiologically and associated with chronic neuropathic changes in median nerve supplied hand muscles.    2.  Left ulnar neuropathy with slowing of conduction and fall in amplitude across the elbow and a mild delay of the onset latency at the wrist.  Both acute and chronic neuropathic changes are noted in ulnar nerve supplied hand muscles.    3.  Right ulnar neuropathy with slowing of conduction across the elbow and a mild prolongation of the onset latency.  Acute and chronic neuropathic changes are noted in ulnar nerve supplied hand muscles.    4.  No evidence of radiculopathy selected muscle studied bilateral upper extremities.              DOUG Najera M.D.

## (undated) DEVICE — LEAD SET 6 DISP. EKG NIHON KOHDEN

## (undated) DEVICE — MIDAS LUBRICATOR DIFFUSER PACK (4EA/CA)

## (undated) DEVICE — SUTURE GENERAL

## (undated) DEVICE — MASK ANESTHESIA ADULT  - (100/CA)

## (undated) DEVICE — SENSOR SPO2 NEO LNCS ADHESIVE (20/BX) SEE USER NOTES

## (undated) DEVICE — TUBING C&T SET FLYING LEADS DRAIN TUBING (10EA/BX)

## (undated) DEVICE — PACK NEURO - (2EA/CA)

## (undated) DEVICE — KIT ROOM DECONTAMINATION

## (undated) DEVICE — CANISTER SUCTION 3000ML MECHANICAL FILTER AUTO SHUTOFF MEDI-VAC NONSTERILE LF DISP  (40EA/CA)

## (undated) DEVICE — GLOVE PROTEXIS W/NEU-THERA SZ 8.5 (50PR/BX)

## (undated) DEVICE — SUTURE 1 VICRYL PLUS CT-1 - 18 INCH (12/BX)

## (undated) DEVICE — SYRINGE SAFETY 10 ML 18 GA X 1 1/2 BLUNT LL (100/BX 4BX/CA)

## (undated) DEVICE — TUBING CLEARLINK DUO-VENT - C-FLO (48EA/CA)

## (undated) DEVICE — PACK JACKSON TABLE KIT W/OUT - HR (6EA/CA)

## (undated) DEVICE — CHLORAPREP 26 ML APPLICATOR - ORANGE TINT(25/CA)

## (undated) DEVICE — SET LEADWIRE 5 LEAD BEDSIDE DISPOSABLE ECG (1SET OF 5/EA)

## (undated) DEVICE — GLOVE BIOGEL PI INDICATOR SZ 8.0 SURGICAL PF LF -(50/BX 4BX/CA)

## (undated) DEVICE — ELECTRODE 850 FOAM ADHESIVE - HYDROGEL RADIOTRNSPRNT (50/PK)

## (undated) DEVICE — KIT ANESTHESIA W/CIRCUIT & 3/LT BAG W/FILTER (20EA/CA)

## (undated) DEVICE — GOWN WARMING STANDARD FLEX - (30/CA)

## (undated) DEVICE — SYRINGE 30 ML LL (56/BX)

## (undated) DEVICE — DRAPE LAPAROTOMY T SHEET - (12EA/CA)

## (undated) DEVICE — LACTATED RINGERS INJ. 500 ML - (24EA/CA)

## (undated) DEVICE — GOWN SURGICAL XX-LARGE - (28EA/CA) SIRUS NON REINFORCED

## (undated) DEVICE — SET EXTENSION WITH 2 PORTS (48EA/CA) ***PART #2C8610 IS A SUBSTITUTE*****

## (undated) DEVICE — CLOSURE SKIN STRIP 1/2 X 4 IN - (STERI STRIP) (50/BX 4BX/CA)

## (undated) DEVICE — SODIUM CHL IRRIGATION 0.9% 1000ML (12EA/CA)

## (undated) DEVICE — NEPTUNE 4 PORT MANIFOLD - (20/PK)

## (undated) DEVICE — SLEEVE, VASO, THIGH, MED

## (undated) DEVICE — SUCTION INSTRUMENT YANKAUER BULBOUS TIP W/O VENT (50EA/CA)

## (undated) DEVICE — TOOL DISSECT MATCH HEAD

## (undated) DEVICE — LIGHT SOURCE MIS 12FT

## (undated) DEVICE — HEADREST PRONEVIEW LARGE - (10/CA)

## (undated) DEVICE — LACTATED RINGERS INJ 1000 ML - (14EA/CA 60CA/PF)

## (undated) DEVICE — FIBRILLAR SURGICEL 4X4 - 10/CA

## (undated) DEVICE — TUBE E-T HI-LO CUFF 8.0MM (10EA/PK)

## (undated) DEVICE — KIT SURGIFLO W/OUT THROMBIN - (6EA/CA)

## (undated) DEVICE — CATHETER EPIDURAL PORTEX (10/BX) ***DISCONTINUED LOOKING INTO SUB***

## (undated) DEVICE — NEEDLE NON-SAFETY HYPO 21 GA X 1 1/2 IN HYPO (100/BX)

## (undated) DEVICE — SPONGE GAUZESTER 4 X 4 4PLY - (128PK/CA)

## (undated) DEVICE — ELECTRODE DUAL RETURN W/ CORD - (50/PK)

## (undated) DEVICE — PROTECTOR ULNA NERVE - (36PR/CA)

## (undated) DEVICE — GLOVE BIOGEL SZ 7 SURGICAL PF LTX - (50PR/BX 4BX/CA)

## (undated) DEVICE — GLOVE BIOGEL INDICATOR SZ 7.5 SURGICAL PF LTX - (50PR/BX 4BX/CA)

## (undated) DEVICE — SUTURE 2-0 VICRYL PLUS CT-1 - 8 X 18 INCH(12/BX)

## (undated) DEVICE — ARMREST CRADLE FOAM - (2PR/PK 12PR/CA)